# Patient Record
Sex: MALE | Race: WHITE | Employment: OTHER | URBAN - METROPOLITAN AREA
[De-identification: names, ages, dates, MRNs, and addresses within clinical notes are randomized per-mention and may not be internally consistent; named-entity substitution may affect disease eponyms.]

---

## 2017-08-24 ENCOUNTER — OFFICE VISIT (OUTPATIENT)
Dept: CARDIOLOGY CLINIC | Age: 72
End: 2017-08-24

## 2017-08-24 VITALS
HEART RATE: 74 BPM | OXYGEN SATURATION: 97 % | HEIGHT: 70 IN | WEIGHT: 183 LBS | DIASTOLIC BLOOD PRESSURE: 90 MMHG | SYSTOLIC BLOOD PRESSURE: 130 MMHG | BODY MASS INDEX: 26.2 KG/M2

## 2017-08-24 DIAGNOSIS — I10 ESSENTIAL HYPERTENSION: ICD-10-CM

## 2017-08-24 DIAGNOSIS — R94.31 ABNORMAL ELECTROCARDIOGRAM: ICD-10-CM

## 2017-08-24 DIAGNOSIS — I42.9 CARDIOMYOPATHY, UNSPECIFIED TYPE (HCC): ICD-10-CM

## 2017-08-24 DIAGNOSIS — I47.1 SVT (SUPRAVENTRICULAR TACHYCARDIA) (HCC): ICD-10-CM

## 2017-08-24 DIAGNOSIS — Z98.890 S/P ABLATION OPERATION FOR ARRHYTHMIA: ICD-10-CM

## 2017-08-24 DIAGNOSIS — R42 DIZZY SPELLS: ICD-10-CM

## 2017-08-24 DIAGNOSIS — Z95.9 CARDIAC DEVICE IN SITU: Primary | ICD-10-CM

## 2017-08-24 DIAGNOSIS — Z86.79 S/P ABLATION OPERATION FOR ARRHYTHMIA: ICD-10-CM

## 2017-08-24 NOTE — LETTER
8/24/2017 10:47 AM 
 
 
Edwar Lozoya Jr. 
[id number] 
1945 Insurance:  Medicare                  Auth # No auth needed Proc Date: Mon 9/25                Proc Time:  8:00am 
 
Performing MD : Dr. Chloe Norris                      Procedure:EP/Poss SVT ablation Hospital:  [facility]                                            PCP . Scheduled with: Brandy Simental / email                                                        Date:8/24/2017 HP: 8/24      EKG: ______    Labs:______  CXR: _______  Orders:  9/18 Special Instructions:  _____________________________________________________ 
______________________________________________________________________ 
______________________________________________________________________ Date Faxed:   ______________   Pages Faxed: ___________________ The materials enclosed with this facsimile transmission are private and confidential and are the property of the sender. If you are not the intended recipient, be advised that any unauthorized use, disclosure, copying, distribution, or the taking of any action in reliance on the contents of this telecopied information is strictly prohibited. If you have received this in error, please immediately notify the sender via telephone to arrange for return of the forwarded documentation.

## 2017-08-24 NOTE — PROGRESS NOTES
History of Present Illness:  A 74 y.o. man here for follow up. He has a Loop recorder in place and he is accompanied by his wife. He went to [facility] last year and did quite well. His wife checks his blood pressure at home and his heart rate has been in the 40s. He had an implantable Loop recorder last April. He denies any new chest pain, dyspnea, syncope, PND, orthopnea or edema. He does have occasional dizziness at varying times during the day. He has a history of PVCs with associated cardiomyopathy. Impression:   Implantable Loop recorder with multiple episodes of SVT lasting up to one minute. History of bradycardia with heart rate down to 40s but nothing recorded on Loop monitor. History of PVCs with ablation a few years ago with Dr. Ty Judd. History of cardiomyopathy, improving to normal with EF 55%. Gastric outflow obstruction and remote pancreatitis, improved. Mild aortic insufficiency. Hypertension. Previous pancreatitis. His device does show multiple episodes of SVT but does not appear to be PVCs or VT. He does not have any syncope but does have some dizziness. I have discussed options. Given his history of bradycardia, I am hesitant to give another beta-blocker. I will schedule him for an electrophysiology study with possible SVT ablation. All questions answered. I will also obtain an echocardiogram to re-evaluate his left ventricular function. Total care time spent in reviewing the case, multiple EMR databases, physician notes, procedure notes, examining the patient, and documentation, is 40 minutes.      Discussed in details with patient at bedside. All questions were answered to their full satisfaction. Risk, benefit and alternatives were discussed. More than 50% time spent in counseling and coordination of care.     Past Medical History:   Diagnosis Date    Anemia     ARF (acute renal failure) (HCC)     Bradycardia     Chronic cholecystitis     DVT (deep venous thrombosis) ([facility]     Echocardiogram 05/21/2015    EF 55-60%. No RWMA. Gr 1 DDfx. Septal knob. RVSP 25-30 mmHg. Mild MR. Mild TR. Mild PI.      ED (erectile dysfunction)     Esophageal reflux     Essential hypertension     GERD (gastroesophageal reflux disease)     Hypercholesteremia     Leukocytosis     Nuclear cardiac imaging test 10/29/2010    Slightly mottled perfusion w/o focal reversible defect. EF 42%. Global hypk. May represent a nonspecific cardiomyopathy.  Pancreatitis     Prostate CA ([facility]     Stage [id number] Junaid sum 3+3, s/p DVP 5/24/07      Ventral hernia        Current Outpatient Prescriptions   Medication Sig Dispense Refill    lansoprazole (PREVACID) 30 mg capsule Take  by mouth Daily (before breakfast).  Cholecalciferol, Vitamin D3, (VITAMIN D3) 1,000 unit cap Take  by mouth.  therapeutic multivitamin (THERAGRAN) tablet Take 1 tablet by mouth daily.  polyethylene glycol (MIRALAX) 17 gram packet Take 17 g by mouth daily.  DOCUSATE SODIUM (COLACE PO) Take  by mouth. Social History   reports that he quit smoking about 33 years ago. He has a 19.00 pack-year smoking history. He does not have any smokeless tobacco history on file. reports that he drinks alcohol. Family History  family history includes Cancer in his mother; Heart Disease in his father; Hypertension in his maternal grandmother and mother. Review of Systems  Except as stated above include:  Constitutional: Negative for fever, chills and malaise/fatigue. HEENT: No congestion or recent URI. Gastrointestinal: No nausea, vomiting, abdominal pain, bloody stools. Pulmonary:  Negative except as stated above. Cardiac:  Negative except as stated above. Musculoskeletal: Negative except as stated above. Neurological:  No localized symptoms. Skin:  Negative except as stated above. Psych:  No mood swings. Endocrine:  No heat/cold intolerance.     PHYSICAL EXAM  BP Readings from Last 3 Encounters:   08/24/17 130/90   08/25/16 120/80   05/27/16 124/90     Pulse Readings from Last 3 Encounters:   08/24/17 74   08/25/16 67   05/27/16 83     Wt Readings from Last 3 Encounters:   08/24/17 83 kg (183 lb)   08/25/16 85.7 kg (189 lb)   05/27/16 83.5 kg (184 lb)     General:   Well developed, well groomed. Head/Neck:   No jugular venous distention     No carotid bruits. No evidence of xanthelasma. Lungs:   No respiratory distress. Clear bilaterally. Heart:    Regular rate and rhythm. Normal S1/S2. Palpation of heart with normal point of maximum impulse. No significant murmurs, rubs or gallops. Abdomen:   Soft and nontender. No palpable abdominal mass or bruits. Extremities:   Intact peripheral pulses. No significant edema. Neurological:   Alert and oriented to person, place, time. No focal neurological deficit visually.

## 2017-08-24 NOTE — PROGRESS NOTES
1. Have you been to the ER, urgent care clinic since your last visit? Hospitalized since your last visit? No     2. Have you seen or consulted any other health care providers outside of the [address] [place] [address] since your last visit? Include any pap smears or colon screening.  No

## 2017-08-24 NOTE — MR AVS SNAPSHOT
Visit Information Date & Time Provider Department Dept. Phone Encounter #  
 8/24/2017 10:00 AM Valentino Rosa MD [facility] [address] [id number] Follow-up Instructions Follow-up and Disposition History Your Appointments 9/18/2017  1:00 PM  
PRE PROCEDURE with Bp [facility] ([facility]) Appt Note: EP /Poss SVT 9/25 [place] [id number] [phone] [address] [place], [place] [address] Upcoming Health Maintenance Date Due DTaP/Tdap/Td series (1 - Tdap) 6/23/1964 FOBT Q 1 YEAR AGE 50-75 6/23/1993 ZOSTER VACCINE AGE 60> 4/23/2003 GLAUCOMA SCREENING Q2Y 6/23/2008 Pneumococcal 65+ High/Highest Risk (1 of 2 - PCV13) 6/23/2008 MEDICARE YEARLY EXAM 6/23/2008 INFLUENZA AGE 9 TO ADULT 8/1/2017 Allergies as of 8/24/2017  Review Complete On: 8/24/2017 By: Valentino Rosa MD  
  
 Severity Noted Reaction Type Reactions Hydrocod-cpm-pe-acetaminophen  06/04/2013    Nausea and Vomiting Current Immunizations  Never Reviewed No immunizations on file. Not reviewed this visit You Were Diagnosed With   
  
 Codes Comments Cardiac device in situ    -  Primary ICD-10-CM: Z95.9 ICD-9-CM: V45.00 Essential hypertension     ICD-10-CM: I10 
ICD-9-CM: 401.9 S/P ablation operation for arrhythmia     ICD-10-CM: Z98.890, Z86.79 
ICD-9-CM: V45.89 Dizzy spells     ICD-10-CM: Z16 ICD-9-CM: 780.4 SVT (supraventricular tachycardia) (HCC)     ICD-10-CM: I47.1 ICD-9-CM: 427.89 Cardiomyopathy, unspecified type ([facility]     ICD-10-CM: I42.9 ICD-9-CM: 425.4 Abnormal electrocardiogram     ICD-10-CM: R94.31 
ICD-9-CM: 794.31 Vitals BP Pulse Height(growth percentile) Weight(growth percentile) SpO2 BMI  
 130/90 74 5' 10\" (1.778 m) 183 lb (83 kg) 97% 26.26 kg/m2 Smoking Status Former Smoker Vitals History BMI and BSA Data Body Mass Index Body Surface Area  
 26.26 kg/m 2 2.02 m 2 Preferred Pharmacy Pharmacy Name Phone [facility] [place], [address] [phone] Your Updated Medication List  
  
   
This list is accurate as of: 8/24/17 10:52 AM.  Always use your most recent med list.  
  
  
  
  
 Amanda Fake Take  by mouth.  
  
 lansoprazole 30 mg capsule Commonly known as:  PREVACID Take  by mouth Daily (before breakfast). MIRALAX 17 gram packet Generic drug:  polyethylene glycol Take 17 g by mouth daily. therapeutic multivitamin tablet Commonly known as:  [facility] Take 1 tablet by mouth daily. VITAMIN D3 1,000 unit Cap Generic drug:  cholecalciferol Take  by mouth. We Performed the Following AMB POC EKG ROUTINE W/ 12 LEADS, INTER & REP [58057 CPT(R)] To-Do List   
 08/24/2017 ECHO:  2D ECHO COMPLETE ADULT (TTE) W OR WO CONTR   
  
 08/28/2017 7:30 AM  
  Appointment with [address] CV SERV at [facility] NON-INVASIVE CARD ([phone]) Patient needs to bring a current list of all medications  No preparation is required for this study  This study requires patient to bring a written physicians order or the MD office may fax the order to Central Scheduling at [phone]. This exam is performed in the [address] [place] [address] at [facility] in the echo department. Please have the patient arrive 15 minutes prior to their schedule appointment time and report to Patient Registration at the main entrance of the hospital.     AGE LIMIT for this procedure at [facility] is 25years old. All patients under 25years of age should be referred to [facility]. Introducing [facility] & HEALTH SERVICES! Samia Paiz introduces Play[employer]test patient portal. Now you can access parts of your medical record, email your doctor's office, and request medication refills online. 1. In your internet browser, go to [email or web address] 2. Click on the First Time User? Click Here link in the Sign In box. You will see the New Member Sign Up page. 3. Enter your [employer] Access Code exactly as it appears below. You will not need to use this code after youve completed the sign-up process. If you do not sign up before the expiration date, you must request a new code. · [employer] Access Code: [email or web address] Expires: 11/22/2017  9:47 AM 
 
4. Enter the last four digits of your Social Security Number (xxxx) and Date of Birth (mm/dd/yyyy) as indicated and click Submit. You will be taken to the next sign-up page. 5. Create a [employer] ID. This will be your [employer] Go login ID and cannot be changed, so think of one that is secure and easy to remember. 6. Create a [employer] password. You can change your password at any time. 7. Enter your Password Reset Question and Answer. This can be used at a later time if you forget your password. 8. Enter your e-mail address. You will receive e-mail notification when new information is available in [address] 9. Click Sign Up. You can now view and download portions of your medical record. 10. Click the Download Summary menu link to download a portable copy of your medical information. If you have questions, please visit the Frequently Asked Questions section of the [employer] website. Remember, [employer] is NOT to be used for urgent needs. For medical emergencies, dial 911. Now available from your iPhone and Android! Please provide this summary of care documentation to your next provider. Your primary care clinician is listed as So Malhotra. If you have any questions after today's visit, please call [phone].

## 2017-08-28 ENCOUNTER — HOSPITAL ENCOUNTER (OUTPATIENT)
Dept: NON INVASIVE DIAGNOSTICS | Age: 72
Discharge: HOME | End: 2017-08-28
Attending: INTERNAL MEDICINE
Payer: MEDICARE

## 2017-08-28 DIAGNOSIS — I42.9 CARDIOMYOPATHY, UNSPECIFIED TYPE (HCC): ICD-10-CM

## 2017-08-28 DIAGNOSIS — R94.31 ABNORMAL ELECTROCARDIOGRAM: ICD-10-CM

## 2017-08-28 PROCEDURE — 93306 TTE W/DOPPLER COMPLETE: CPT

## 2017-08-29 NOTE — PROGRESS NOTES
Per your last note \"  I will also obtain an echocardiogram to re-evaluate his left ventricular function

## 2017-09-18 ENCOUNTER — HOSPITAL ENCOUNTER (OUTPATIENT)
Dept: GENERAL RADIOLOGY | Age: 72
Discharge: HOME | End: 2017-09-18
Payer: MEDICARE

## 2017-09-18 ENCOUNTER — OFFICE VISIT (OUTPATIENT)
Dept: CARDIOLOGY CLINIC | Age: 72
End: 2017-09-18

## 2017-09-18 ENCOUNTER — HOSPITAL ENCOUNTER (OUTPATIENT)
Dept: LAB | Age: 72
Discharge: HOME | End: 2017-09-18
Payer: MEDICARE

## 2017-09-18 DIAGNOSIS — I47.1 SVT (SUPRAVENTRICULAR TACHYCARDIA) (HCC): ICD-10-CM

## 2017-09-18 DIAGNOSIS — Z01.812 PRE-PROCEDURE LAB EXAM: ICD-10-CM

## 2017-09-18 DIAGNOSIS — Z95.9 CARDIAC DEVICE IN SITU: ICD-10-CM

## 2017-09-18 DIAGNOSIS — I47.1 SVT (SUPRAVENTRICULAR TACHYCARDIA) (HCC): Primary | ICD-10-CM

## 2017-09-18 LAB
ALBUMIN SERPL-MCNC: 4 G/DL (ref 3.4–5)
ALBUMIN/GLOB SERPL: 1.1 {RATIO} (ref 0.8–1.7)
ALP SERPL-CCNC: 62 U/L (ref 45–117)
ALT SERPL-CCNC: 20 U/L (ref 16–61)
ANION GAP SERPL CALC-SCNC: 7 MMOL/L (ref 3–18)
AST SERPL-CCNC: 15 U/L (ref 15–37)
BASOPHILS # BLD: 0 K/UL (ref 0–0.1)
BASOPHILS NFR BLD: 1 % (ref 0–2)
BILIRUB SERPL-MCNC: 0.5 MG/DL (ref 0.2–1)
BUN SERPL-MCNC: 14 MG/DL (ref 7–18)
BUN/CREAT SERPL: 18 (ref 12–20)
CALCIUM SERPL-MCNC: 9.6 MG/DL (ref 8.5–10.1)
CHLORIDE SERPL-SCNC: 106 MMOL/L (ref 100–108)
CO2 SERPL-SCNC: 27 MMOL/L (ref 21–32)
CREAT SERPL-MCNC: 0.76 MG/DL (ref 0.6–1.3)
DIFFERENTIAL METHOD BLD: ABNORMAL
EOSINOPHIL # BLD: 0.1 K/UL (ref 0–0.4)
EOSINOPHIL NFR BLD: 3 % (ref 0–5)
ERYTHROCYTE [DISTWIDTH] IN BLOOD BY AUTOMATED COUNT: 13.3 % (ref 11.6–14.5)
GLOBULIN SER CALC-MCNC: 3.8 G/DL (ref 2–4)
GLUCOSE SERPL-MCNC: 89 MG/DL (ref 74–99)
HCT VFR BLD AUTO: 39.9 % (ref 36–48)
HGB BLD-MCNC: 13.5 G/DL (ref 13–16)
INR PPP: 1.1 (ref 0.8–1.2)
LYMPHOCYTES # BLD: 1.2 K/UL (ref 0.9–3.6)
LYMPHOCYTES NFR BLD: 23 % (ref 21–52)
MCH RBC QN AUTO: 29.7 PG (ref 24–34)
MCHC RBC AUTO-ENTMCNC: 33.8 G/DL (ref 31–37)
MCV RBC AUTO: 87.9 FL (ref 74–97)
MONOCYTES # BLD: 0.3 K/UL (ref 0.05–1.2)
MONOCYTES NFR BLD: 6 % (ref 3–10)
NEUTS SEG # BLD: 3.4 K/UL (ref 1.8–8)
NEUTS SEG NFR BLD: 67 % (ref 40–73)
PLATELET # BLD AUTO: 152 K/UL (ref 135–420)
PMV BLD AUTO: 10.7 FL (ref 9.2–11.8)
POTASSIUM SERPL-SCNC: 4.3 MMOL/L (ref 3.5–5.5)
PROT SERPL-MCNC: 7.8 G/DL (ref 6.4–8.2)
PROTHROMBIN TIME: 14 SEC (ref 11.5–15.2)
RBC # BLD AUTO: 4.54 M/UL (ref 4.7–5.5)
SODIUM SERPL-SCNC: 140 MMOL/L (ref 136–145)
WBC # BLD AUTO: 5 K/UL (ref 4.6–13.2)

## 2017-09-18 PROCEDURE — 85610 PROTHROMBIN TIME: CPT | Performed by: INTERNAL MEDICINE

## 2017-09-18 PROCEDURE — 71020 XR CHEST PA LAT: CPT

## 2017-09-18 PROCEDURE — 85025 COMPLETE CBC W/AUTO DIFF WBC: CPT | Performed by: INTERNAL MEDICINE

## 2017-09-18 PROCEDURE — 36415 COLL VENOUS BLD VENIPUNCTURE: CPT | Performed by: INTERNAL MEDICINE

## 2017-09-18 PROCEDURE — 80053 COMPREHEN METABOLIC PANEL: CPT | Performed by: INTERNAL MEDICINE

## 2017-09-18 RX ORDER — SODIUM CHLORIDE 9 MG/ML
25 INJECTION, SOLUTION INTRAVENOUS CONTINUOUS
Status: CANCELLED | OUTPATIENT
Start: 2017-09-18

## 2017-09-18 NOTE — PATIENT INSTRUCTIONS
DR. COUGHLIN'S [facility]          Patient  EP Instructions                  1. You are scheduled to have a EP with possible SVT ablation on  9/25/17 , at 8 am.    Please check in at 7 am .    2. Please go to DR. COUGHLIN'STEPHANY TSANG and jessica in the outpatient parking lot that is located around to the back of the hospital and enter through the [facility] building. Once you enter through the [facility] check in with the  there. The  will either give you directions or assist you in getting to the cath holding area. 3.  [x]       You are not to eat or drink anything after midnight the morning of the procedure. 4. Please continue to take your medications with a small sip of water on the morning of the procedure with the following exceptions:  N/A     5. If you are diabetic, do not take your insulin/sugar pill the morning of the procedure. 6. We encourage families to wait in the waiting room on the first floor while the procedure is being done. The Doctor will come out and talk with you as soon as the procedure is over. 7. There is the possibility that you may spend the night in the hospital, depending on the results of the procedure. This will be determined after the procedure is done. 8.   If you or your family have any questions, please call our office Monday-Friday 9:00am         -4:30 pm , at [phone], and ask to speak to one of the nurses.

## 2017-09-18 NOTE — MR AVS SNAPSHOT
Visit Information Date & Time Provider Department Dept. Phone Encounter #  
 9/18/2017  1:00 PM Jaspreet Cortez Lazaro Lr [facility] [address] [id number] Your Appointments 9/18/2017  1:00 PM  
PRE PROCEDURE with B[facility] [place] [facility] ([facility]) Appt Note: EP /Poss SVT 9/25 [facility] [address] [address] [id number] [phone] [address] [place] [address] [address] Upcoming Health Maintenance Date Due DTaP/Tdap/Td series (1 - Tdap) 6/23/1964 FOBT Q 1 YEAR AGE 50-75 6/23/1993 ZOSTER VACCINE AGE 60> 4/23/2003 GLAUCOMA SCREENING Q2Y 6/23/2008 Pneumococcal 65+ High/Highest Risk (1 of 2 - PCV13) 6/23/2008 MEDICARE YEARLY EXAM 6/23/2008 INFLUENZA AGE 9 TO ADULT 8/1/2017 Allergies as of 9/18/2017  Review Complete On: 8/24/2017 By: Kate Barger MD  
  
 Severity Noted Reaction Type Reactions Hydrocod-cpm-pe-acetaminophen  06/04/2013    Nausea and Vomiting Current Immunizations  Never Reviewed No immunizations on file. Not reviewed this visit You Were Diagnosed With   
  
 Codes Comments SVT (supraventricular tachycardia) ([facility]    -  Primary ICD-10-CM: I47.1 ICD-9-CM: 427.89 Cardiac device in situ     ICD-10-CM: Z95.9 ICD-9-CM: V45.00 Pre-procedure lab exam     ICD-10-CM: W94.686 ICD-9-CM: V72.63 Vitals Smoking Status Former Smoker Preferred Pharmacy Pharmacy Name Phone [facility] - [address], [address] [phone] Your Updated Medication List  
  
   
This list is accurate as of: 9/18/17  9:38 AM.  Always use your most recent med list.  
  
  
  
  
 Mahnaz Rodrigez Take  by mouth.  
  
 lansoprazole 30 mg capsule Commonly known as:  PREVACID Take  by mouth Daily (before breakfast). MIRALAX 17 gram packet Generic drug:  polyethylene glycol Take 17 g by mouth daily. therapeutic multivitamin tablet Commonly known as:  [facility] Take 1 tablet by mouth daily. VITAMIN D3 1,000 unit Cap Generic drug:  cholecalciferol Take  by mouth. To-Do List   
 09/18/2017 Lab:  CBC WITH AUTOMATED DIFF   
  
 09/18/2017 Lab:  METABOLIC PANEL, COMPREHENSIVE   
  
 09/18/2017 Lab:  PROTHROMBIN TIME + INR   
  
 09/18/2017 Imaging:  XR CHEST PA LAT   
  
 09/25/2017 8:00 AM  
  Appointment with [facility] ANESTHESIA 2; [facility] EP LAB; [facility] CATH HOLDING at [address] [facility] ([phone]) Patient Instructions DR. IVAN [facility] [place] Patient  EP Instructions 1. You are scheduled to have a EP with possible SVT ablation on  9/25/17 , at 8 am.    Please check in at 7 am . 2. Please go to DR. MONCHO TSANG and jessica in the outpatient parking lot that is located around to the back of the hospital and enter through the [facility] building. Once you enter through the [facility] check in with the  there. The  will either give you directions or assist you in getting to the cath holding area. 3.         You are not to eat or drink anything after midnight the morning of the procedure. 4. Please continue to take your medications with a small sip of water on the morning of the procedure with the following exceptions:  N/A  
 
5. If you are diabetic, do not take your insulin/sugar pill the morning of the procedure. 6. We encourage families to wait in the waiting room on the first floor while the procedure is being done. The Doctor will come out and talk with you as soon as the procedure is over. 7. There is the possibility that you may spend the night in the hospital, depending on the results of the procedure. This will be determined after the procedure is done.   
 
8.   If you or your family have any questions, please call our office Monday-Friday 9:00am  
      -4:30 pm , at [phone], and ask to speak to one of the nurses. Introducing [facility] & HEALTH SERVICES! Dear Ced Reyes: Thank you for requesting a PeerPong account. Our records indicate that you already have an active PeerPong account. You can access your account anytime at [email or web address] Did you know that you can access your hospital and ER discharge instructions at any time in PeerPong? You can also review all of your test results from your hospital stay or ER visit. Additional Information If you have questions, please visit the Frequently Asked Questions section of the PeerPong website at [email or web address] Remember, PeerPong is NOT to be used for urgent needs. For medical emergencies, dial 911. Now available from your iPhone and Android! Please provide this summary of care documentation to your next provider. Your primary care clinician is listed as Trisha Roberts. If you have any questions after today's visit, please call [phone].

## 2017-09-18 NOTE — PROGRESS NOTES
Patient given written instructions at time of visit and allowed time for questions to be answered Mino Olivo LPN

## 2017-09-23 NOTE — H&P
Seen & examined. See full H&P/notes for details. Plan EP study with possible SVT ablation. All risks, benefits, alternatives discussed. All questions answered. Risks included but are not limited to pain, infection, bleeding, stroke, perforation, valve damage, nerve damage, diaphragmatic paralysis, rupture of esophagus and possible fistula from heart to esophagus, deep venous thrombosis, emergent open heart surgery, and death both during procedure and post-operatively. History of Present Illness:  A 74 y.o. man here for follow up. He has a Loop recorder in place and he is accompanied by his wife. He went to [facility] last year and did quite well. His wife checks his blood pressure at home and his heart rate has been in the 40s. He had an implantable Loop recorder last April. He denies any new chest pain, dyspnea, syncope, PND, orthopnea or edema. He does have occasional dizziness at varying times during the day. He has a history of PVCs with associated cardiomyopathy.     Impression:   Implantable Loop recorder with multiple episodes of SVT lasting up to one minute. History of bradycardia with heart rate down to 40s but nothing recorded on Loop monitor. History of PVCs with ablation a few years ago with Dr. Jorge Sigala. History of cardiomyopathy, improving to normal with EF 55%. Echo done August 2017 with EF 50%  Gastric outflow obstruction and remote pancreatitis, improved. Mild aortic insufficiency. Hypertension. Previous pancreatitis. History of IVC filter, still in place.     His device does show multiple episodes of SVT but does not appear to be PVCs or VT. He does not have any syncope but does have some dizziness. I have discussed options. Given his history of bradycardia, I am hesitant to give another beta-blocker. I will schedule him for an electrophysiology study with possible SVT ablation. All questions answered.   I will also obtain an echocardiogram to re-evaluate his left ventricular function.       Total care time spent in reviewing the case, multiple EMR databases, physician notes, procedure notes, examining the patient, and documentation, is 40 minutes.      Discussed in details with patient at bedside. All questions were answered to their full satisfaction. Risk, benefit and alternatives were discussed. More than 50% time spent in counseling and coordination of care.          Past Medical History:   Diagnosis Date    Anemia      [facility]      Bradycardia      Chronic cholecystitis      DVT (deep venous thrombosis) ([facility])      Echocardiogram 05/21/2015     EF 55-60%. No RWMA. Gr 1 DDfx. Septal knob. RVSP 25-30 mmHg. Mild MR. Mild TR. Mild PI.      ED (erectile dysfunction)      Esophageal reflux      Essential hypertension      GERD (gastroesophageal reflux disease)      Hypercholesteremia      Leukocytosis      Nuclear cardiac imaging test 10/29/2010     Slightly mottled perfusion w/o focal reversible defect. EF 42%. Global hypk. May represent a nonspecific cardiomyopathy.  Pancreatitis      [facility])       Stage E7oDuNs Junaid sum 3+3, s/p DVP 5/24/07      Ventral hernia                  Current Outpatient Prescriptions   Medication Sig Dispense Refill    lansoprazole (PREVACID) 30 mg capsule Take  by mouth Daily (before breakfast).        Cholecalciferol, Vitamin D3, (VITAMIN D3) 1,000 unit cap Take  by mouth.        therapeutic multivitamin (THERAGRAN) tablet Take 1 tablet by mouth daily.        polyethylene glycol (MIRALAX) 17 gram packet Take 17 g by mouth daily.        DOCUSATE SODIUM (COLACE PO) Take  by mouth.               Social History   reports that he quit smoking about 33 years ago. He has a 19.00 pack-year smoking history. He does not have any smokeless tobacco history on file.    reports that he drinks alcohol.     Family History  family history includes Cancer in his mother; Heart Disease in his father; Hypertension in his maternal grandmother and mother.     Review of Systems  Except as stated above include:  Constitutional: Negative for fever, chills and malaise/fatigue. HEENT: No congestion or recent URI. Gastrointestinal: No nausea, vomiting, abdominal pain, bloody stools. Pulmonary:  Negative except as stated above. Cardiac:  Negative except as stated above. Musculoskeletal: Negative except as stated above. Neurological:  No localized symptoms. Skin:  Negative except as stated above. Psych:  No mood swings. Endocrine:  No heat/cold intolerance.     PHYSICAL EXAM      BP Readings from Last 3 Encounters:   08/24/17 130/90   08/25/16 120/80   05/27/16 124/90          Pulse Readings from Last 3 Encounters:   08/24/17 74   08/25/16 67   05/27/16 83          Wt Readings from Last 3 Encounters:   08/24/17 83 kg (183 lb)   08/25/16 85.7 kg (189 lb)   05/27/16 83.5 kg (184 lb)      General:                    Well developed, well groomed. Head/Neck:               No jugular venous distention                                               No carotid bruits. No evidence of xanthelasma. Lungs:                       No respiratory distress. Clear bilaterally. Heart:                                              Regular rate and rhythm. Normal S1/S2. Palpation of heart with normal point of maximum impulse. No significant murmurs, rubs or gallops. Abdomen:                  Soft and nontender. No palpable abdominal mass or bruits. Extremities:               Intact peripheral pulses. No significant edema. Neurological:             Alert and oriented to person, place, time.                                                 No focal neurological deficit visually.         Electronically signed by Jennifer Young MD at 08/24/17 6630

## 2017-09-25 ENCOUNTER — ANESTHESIA EVENT (OUTPATIENT)
Dept: CARDIAC CATH/INVASIVE PROCEDURES | Age: 72
End: 2017-09-25
Payer: MEDICARE

## 2017-09-25 ENCOUNTER — ANESTHESIA (OUTPATIENT)
Dept: CARDIAC CATH/INVASIVE PROCEDURES | Age: 72
End: 2017-09-25
Payer: MEDICARE

## 2017-09-25 ENCOUNTER — HOSPITAL ENCOUNTER (OUTPATIENT)
Dept: CARDIAC CATH/INVASIVE PROCEDURES | Age: 72
Discharge: HOME | End: 2017-09-25
Attending: INTERNAL MEDICINE | Admitting: INTERNAL MEDICINE
Payer: MEDICARE

## 2017-09-25 VITALS
WEIGHT: 178 LBS | SYSTOLIC BLOOD PRESSURE: 111 MMHG | OXYGEN SATURATION: 98 % | HEART RATE: 66 BPM | BODY MASS INDEX: 25.48 KG/M2 | TEMPERATURE: 97.6 F | HEIGHT: 70 IN | RESPIRATION RATE: 16 BRPM | DIASTOLIC BLOOD PRESSURE: 69 MMHG

## 2017-09-25 PROCEDURE — 74011250636 HC RX REV CODE- 250/636: Performed by: INTERNAL MEDICINE

## 2017-09-25 PROCEDURE — 74011250636 HC RX REV CODE- 250/636

## 2017-09-25 PROCEDURE — 74011000250 HC RX REV CODE- 250

## 2017-09-25 PROCEDURE — 76060000034 HC ANESTHESIA 1.5 TO 2 HR

## 2017-09-25 PROCEDURE — 74011000250 HC RX REV CODE- 250: Performed by: INTERNAL MEDICINE

## 2017-09-25 PROCEDURE — 77030018729 EP STUDY

## 2017-09-25 RX ORDER — MIDAZOLAM HYDROCHLORIDE 1 MG/ML
INJECTION, SOLUTION INTRAMUSCULAR; INTRAVENOUS AS NEEDED
Status: DISCONTINUED | OUTPATIENT
Start: 2017-09-25 | End: 2017-09-25 | Stop reason: HOSPADM

## 2017-09-25 RX ORDER — SODIUM CHLORIDE 9 MG/ML
25 INJECTION, SOLUTION INTRAVENOUS CONTINUOUS
Status: DISCONTINUED | OUTPATIENT
Start: 2017-09-25 | End: 2017-09-25 | Stop reason: HOSPADM

## 2017-09-25 RX ORDER — PROPOFOL 10 MG/ML
INJECTION, EMULSION INTRAVENOUS AS NEEDED
Status: DISCONTINUED | OUTPATIENT
Start: 2017-09-25 | End: 2017-09-25 | Stop reason: HOSPADM

## 2017-09-25 RX ORDER — ISOPROTERENOL HYDROCHLORIDE 0.2 MG/ML
0-100 INJECTION, SOLUTION INTRAMUSCULAR; INTRAVENOUS ONCE
Status: DISCONTINUED | OUTPATIENT
Start: 2017-09-25 | End: 2017-09-25 | Stop reason: HOSPADM

## 2017-09-25 RX ORDER — PROPOFOL 10 MG/ML
INJECTION, EMULSION INTRAVENOUS
Status: DISCONTINUED | OUTPATIENT
Start: 2017-09-25 | End: 2017-09-25 | Stop reason: HOSPADM

## 2017-09-25 RX ORDER — HEPARIN SODIUM 200 [USP'U]/100ML
500 INJECTION, SOLUTION INTRAVENOUS ONCE
Status: COMPLETED | OUTPATIENT
Start: 2017-09-25 | End: 2017-09-25

## 2017-09-25 RX ORDER — OXYCODONE AND ACETAMINOPHEN 5; 325 MG/1; MG/1
1 TABLET ORAL
Qty: 15 TAB | Refills: 0 | Status: SHIPPED | OUTPATIENT
Start: 2017-09-25 | End: 2017-12-28 | Stop reason: ALTCHOICE

## 2017-09-25 RX ORDER — FENTANYL CITRATE 50 UG/ML
INJECTION, SOLUTION INTRAMUSCULAR; INTRAVENOUS AS NEEDED
Status: DISCONTINUED | OUTPATIENT
Start: 2017-09-25 | End: 2017-09-25 | Stop reason: HOSPADM

## 2017-09-25 RX ORDER — ONDANSETRON 2 MG/ML
INJECTION INTRAMUSCULAR; INTRAVENOUS AS NEEDED
Status: DISCONTINUED | OUTPATIENT
Start: 2017-09-25 | End: 2017-09-25 | Stop reason: HOSPADM

## 2017-09-25 RX ORDER — LIDOCAINE HYDROCHLORIDE 10 MG/ML
1-90 INJECTION, SOLUTION EPIDURAL; INFILTRATION; INTRACAUDAL; PERINEURAL
Status: DISCONTINUED | OUTPATIENT
Start: 2017-09-25 | End: 2017-09-25 | Stop reason: HOSPADM

## 2017-09-25 RX ADMIN — ONDANSETRON 4 MG: 2 INJECTION INTRAMUSCULAR; INTRAVENOUS at 09:05

## 2017-09-25 RX ADMIN — HEPARIN SODIUM 1000 UNITS: 200 INJECTION, SOLUTION INTRAVENOUS at 08:26

## 2017-09-25 RX ADMIN — PROPOFOL 50 MCG/KG/MIN: 10 INJECTION, EMULSION INTRAVENOUS at 08:50

## 2017-09-25 RX ADMIN — FENTANYL CITRATE 50 MCG: 50 INJECTION, SOLUTION INTRAMUSCULAR; INTRAVENOUS at 08:06

## 2017-09-25 RX ADMIN — LIDOCAINE HYDROCHLORIDE 20 ML: 10 INJECTION, SOLUTION EPIDURAL; INFILTRATION; INTRACAUDAL; PERINEURAL at 08:31

## 2017-09-25 RX ADMIN — MIDAZOLAM HYDROCHLORIDE 1 MG: 1 INJECTION, SOLUTION INTRAMUSCULAR; INTRAVENOUS at 07:59

## 2017-09-25 RX ADMIN — MIDAZOLAM HYDROCHLORIDE 1 MG: 1 INJECTION, SOLUTION INTRAMUSCULAR; INTRAVENOUS at 08:03

## 2017-09-25 RX ADMIN — FENTANYL CITRATE 50 MCG: 50 INJECTION, SOLUTION INTRAMUSCULAR; INTRAVENOUS at 08:21

## 2017-09-25 RX ADMIN — PROPOFOL 50 MG: 10 INJECTION, EMULSION INTRAVENOUS at 08:30

## 2017-09-25 NOTE — ANESTHESIA PREPROCEDURE EVALUATION
Anesthetic History   No history of anesthetic complications            Review of Systems / Medical History  Patient summary reviewed and pertinent labs reviewed    Pulmonary  Within defined limits                 Neuro/Psych   Within defined limits           Cardiovascular    Hypertension: well controlled        Dysrhythmias : PVC and SVT      Exercise tolerance: >4 METS     GI/Hepatic/Renal     GERD: well controlled           Endo/Other        Cancer (prostate)     Other Findings   Comments:   Risk Factors for Postoperative nausea/vomiting:       History of postoperative nausea/vomiting? NO       Female? NO       Motion sickness? NO       Intended opioid administration for postoperative analgesia? YES      Smoking Abstinence  Current Smoker? NO  Elective Surgery? YES  Seen preoperatively by anesthesiologist or proxy prior to day of surgery? YES  Pt abstained from smoking 24 hours prior to anesthesia?  N/A         Physical Exam    Airway  Mallampati: II  TM Distance: 4 - 6 cm  Neck ROM: normal range of motion   Mouth opening: Normal     Cardiovascular  Regular rate and rhythm,  S1 and S2 normal,  no murmur, click, rub, or gallop  Rhythm: regular  Rate: normal         Dental    Dentition: Upper partial plate     Pulmonary  Breath sounds clear to auscultation               Abdominal  GI exam deferred       Other Findings            Anesthetic Plan    ASA: 3  Anesthesia type: MAC          Induction: Intravenous  Anesthetic plan and risks discussed with: Patient

## 2017-09-25 NOTE — ROUTINE PROCESS
TRANSFER - IN REPORT:    Verbal report received from Jerrald Sicard, RCIS (name) on [facility].  being received from [place] (unit) for routine progression of care      Report consisted of patients Situation, Background, Assessment and   Recommendations(SBAR). Information from the following report(s) SBAR, Kardex, Procedure Summary and MAR was reviewed with the receiving nurse. Opportunity for questions and clarification was provided. Assessment completed upon patients arrival to unit and care assumed.

## 2017-09-25 NOTE — PROGRESS NOTES
Pt returned to [facility] post procedure. Pt alert and oriented times four. Pt denies pain at this time. Right groin site dressing clean, dry, and intact. No hematoma or bleeding noted at this time. Pt educated on post procedure care. Pt verbalized understanding. Pt provided with warm blanket and call bell. Wife brought to bedside per his request. Will continue to monitor.

## 2017-09-25 NOTE — ROUTINE PROCESS
TRANSFER - OUT REPORT:    Verbal report given to Anaya New RN (name) on [facility].  being transferred to [facility] ([facility]) for routine progression of care       Report consisted of patients Situation, Background, Assessment and   Recommendations(SBAR). Information from the following report(s) SBAR, Kardex, Procedure Summary and MAR was reviewed with the receiving nurse. Lines:   Peripheral IV 04/19/16 Left Hand (Active)        Opportunity for questions and clarification was provided.       Patient transported with:   [employer]

## 2017-09-25 NOTE — ANESTHESIA POSTPROCEDURE EVALUATION
Post-Anesthesia Evaluation and Assessment    Patient: Miguel Garrett MRN: [id number]  SSN: [id number]    YOB: 1945  Age: 74 y.o. Sex: male      Data from PACU flowsheet    Cardiovascular Function/Vital Signs  Visit Vitals    /72    Pulse 64    Temp 36.4 °C (97.6 °F)    Resp 12    Ht 5' 10\" (1.778 m)    Wt 80.7 kg (178 lb)    SpO2 95%    BMI 25.54 kg/m2       Patient is status post MAC anesthesia for * No procedures listed *. Nausea/Vomiting: controlled    Postoperative hydration reviewed and adequate. Pain:  Pain Scale 1: Numeric (0 - 10) (09/25/17 0717)  Pain Intensity 1: 0 (09/25/17 0930)   Managed      Mental Status and Level of Consciousness: Alert and oriented     Pulmonary Status:   O2 Device: Room air (09/25/17 1025)   Adequate oxygenation and airway patent    Complications related to anesthesia: None    Post-anesthesia assessment completed.  No concerns    Signed By: Mary Anne Brantley MD     September 25, 2017

## 2017-09-25 NOTE — IP AVS SNAPSHOT
Ravinder Dow 
 
 
 [address] Patient: Ekaterina Inman. MRN: [id number] DE [place]:[id number] You are allergic to the following Allergen Reactions Hydrocod-Cpm-Pe-Acetaminophen Nausea and Vomiting Recent Documentation Height Weight BMI Smoking Status 1.778 m 80.7 kg 25.54 kg/m2 Former Smoker Emergency Contacts Name Discharge Info Relation Home Work Mobile [address] CAREGIVER [3] Spouse [3] [phone] About your hospitalization You were admitted on:  September 25, 2017 You last received care in the:  [facility] [address] You were discharged on:  September 25, 2017 Unit phone number:  [phone] Why you were hospitalized Your primary diagnosis was:  Not on File Providers Seen During Your Hospitalizations Provider Role Specialty Primary office phone Jen Yates MD Attending Provider Cardiology [phone] Your Primary Care Physician (PCP) Primary Care Physician Office Phone Office Fax Babar Roth, [address] [facility] [phone] Follow-up Information Follow up With Details Comments Contact Info Bruno Pandey MD   [address] [facility] [address] [place] [address] [phone] Jen Yates MD In 3 months follow up [address] [facility] [address] [facility] [address] [place] [address] 
[phone] Current Discharge Medication List  
  
START taking these medications Dose & Instructions Dispensing Information Comments Morning Noon Evening Bedtime  
 oxyCODONE-acetaminophen 5-325 mg per tablet Commonly known as:  PERCOCET Your last dose was: Your next dose is:    
   
   
 Dose:  1 Tab Take 1 Tab by mouth every six (6) hours as needed for Pain. Max Daily Amount: 4 Tabs. Quantity:  15 Tab Refills:  0 CONTINUE these medications which have NOT CHANGED Dose & Instructions Dispensing Information Comments Morning Noon Evening Bedtime COLACE PO Your last dose was: Your next dose is: Take  by mouth. Refills:  0  
     
   
   
   
  
 lansoprazole 30 mg capsule Commonly known as:  PREVACID Your last dose was: Your next dose is: Take  by mouth Daily (before breakfast). Refills:  0 MIRALAX 17 gram packet Generic drug:  polyethylene glycol Your last dose was: Your next dose is:    
   
   
 Dose:  17 g Take 17 g by mouth as needed. Refills:  0  
     
   
   
   
  
 therapeutic multivitamin tablet Commonly known as:  [facility] Your last dose was: Your next dose is:    
   
   
 Dose:  1 Tab Take 1 tablet by mouth daily. Refills:  0  
     
   
   
   
  
 VITAMIN D3 1,000 unit Cap Generic drug:  cholecalciferol Your last dose was: Your next dose is: Take  by mouth. Refills:  0 Where to Get Your Medications Information on where to get these meds will be given to you by the nurse or doctor. ! Ask your nurse or doctor about these medications  
  oxyCODONE-acetaminophen 5-325 mg per tablet Discharge Instructions Disposition: When discharged to home will need follow-up in my office 4 weeks. Please call my office at [phone] if any questions or concerns. Restrictions: 
No driving for at least 24 hours after surgery. No heavy lifting > 10 lbs or prolonged standing, walking, or running x 1 week. OK to shower today over incision and remove dressing. Monitor groin for any signs of bleeding and please contact office at [phone] if any issues.   There may be some mild bruising which is not unusual. 
If any new symptoms develop, such as chest pain, dyspnea, dizziness, please contact office at [phone] immediately. DISCHARGE SUMMARY from Nurse The following personal items are in your possession at time of discharge: 
 
  
Visual Aid: At bedside PATIENT INSTRUCTIONS: 
 
 
F-face looks uneven A-arms unable to move or move unevenly S-speech slurred or non-existent T-time-call 911 as soon as signs and symptoms begin-DO NOT go Back to bed or wait to see if you get better-TIME IS BRAIN. Warning Signs of HEART ATTACK Call 911 if you have these symptoms: 
? Chest discomfort. Most heart attacks involve discomfort in the center of the chest that lasts more than a few minutes, or that goes away and comes back. It can feel like uncomfortable pressure, squeezing, fullness, or pain. ? Discomfort in other areas of the upper body. Symptoms can include pain or discomfort in one or both arms, the back, neck, jaw, or stomach. ? Shortness of breath with or without chest discomfort. ? Other signs may include breaking out in a cold sweat, nausea, or lightheadedness. Don't wait more than five minutes to call [address]! Fast action can save your life. Calling 911 is almost always the fastest way to get lifesaving treatment. Emergency Medical Services staff can begin treatment when they arrive  up to an hour sooner than if someone gets to the hospital by car. The discharge information has been reviewed with the patient and caregiver. The patient and caregiver verbalized understanding. Discharge medications reviewed with the patient and caregiver and appropriate educational materials and side effects teaching were provided. It is normal to feel tired the first couple days. Take it easy and follow the physicians instructions.    
 
CHECK THE CATHETER INSERTION SITE DAILY: 
 You may shower 24 hours after the procedure, remove the bandage during showering. Wash with soap and water and pat dry. Gentle cleaning of the site with soap and water is sufficient, cover with a dry clean dressing or bandage. Do not apply creams or powders to the area. Do not sit in a bathtub or pool of water for 7 days or until wound has completely healed. Temporary bruising and discomfort is normal and may last a few weeks. You may have a  formation of a small lump at the site which may last up to 6 weeks. CALL THE PHYSICIAN: If the site becomes red, swollen or feels warm to the touch If there is bleeding or drainage or if there is unusual pain at the groin or down the leg. If there is any bleeding, lie down, apply pressure or have someone apply pressure with a clean cloth until the bleeding stops. If the bleeding continues, call 911 to be transported to the hospital. 
DO  [facility] [address]. Activity: For the first 24-48 hours or as instructed by the physician: No lifting, pushing or pulling over 10 pounds and no straining the insertion site. Do not life grocery bags or the garbage can, do not run the vacuum  or  for 7 days. Start with short walks as in the hospital and gradually increase as tolerated each day. It is recommended to walk 30 minutes 5-7 days per week. Follow your physicians instructions on activity. Avoid walking outside in extremes of heat or cold. Walk inside when it is cold and windy or hot and humid. Things to keep in mind: 
No driving for at least 24 hours, or as designated by your physician. Limit the number of times you go up and down the stairs Take rests and pace yourself with activity. Be careful and do not strain with bowel movements. Medications: Take all medications as prescribed Call your physician if you have any questions Keep an updated list of your medications with you at all times and give a list to your physician and pharmacist 
 
Signs and Symptoms: 
Be cautious of symptoms of angina or recurrent symptoms such as chest discomfort, unusual shortness of breath or fatigue, palpitations. After Care: Follow up with your physician as instructed. Follow a heart healthy diet with proper portion control, daily stress management, daily exercise, blood pressure and cholesterol control , and smoking cessation. Patient armband removed and shredded Discharge Orders None [facility]! Dear Tal Becerra: Thank you for requesting a im3D account. Our records indicate that you already have an active im3D account. You can access your account anytime at [email or web address] Did you know that you can access your hospital and ER discharge instructions at any time in im3D? You can also review all of your test results from your hospital stay or ER visit. Additional Information If you have questions, please visit the Frequently Asked Questions section of the im3D website at [email or web address] Remember, im3D is NOT to be used for urgent needs. For medical emergencies, dial 911. Now available from your iPhone and Android! General Information Please provide this summary of care documentation to your next provider. Patient Signature:  ____________________________________________________________ Date:  ____________________________________________________________  
  
Odessa Ortega Provider Signature:  ____________________________________________________________ Date:  ____________________________________________________________

## 2017-09-25 NOTE — PROCEDURES
PROCEDURE    1. Cardiac electrophysiology study with infusion of isoproterenol. 2. Pacing and sensing from the right atrium, left atrium via the coronary sinus, and right ventricle with attempted arrhythmia induction. 3. Placement of coronary sinus, right ventricular, and right atrial catheters. 4. Sedation with anesthesia assistance.     PREPROCEDURE DIAGNOSIS:  Implantable Loop recorder with multiple episodes of SVT lasting up to one minute. History of bradycardia with heart rate down to 40s but nothing recorded on Loop monitor. History of PVCs with ablation a few years ago with Dr. Jason Spaulding. History of cardiomyopathy, improving to normal with EF 55%. Echo done August 2017 with EF 50%  Gastric outflow obstruction and remote pancreatitis, improved. Mild aortic insufficiency. Hypertension. Previous pancreatitis. History of IVC filter, still in place.     POSTPROCEDURE DIAGNOSIS:  No inducible SVT despite aggressive atrial pacing with isuprel.     PROCEDURE: After informed consent was obtained, the patient was brought to electrophysiology lab in a fasting, nonsedated state. The right groin was prepped and draped in the usual sterile fashion. Local lidocaine was infiltrated just above the right femoral vein. In the right femoral vein was inserted a 6, 8, 7-Maltese sheath. A decapolar deflectable catheter was placed in the coronary sinus. A CRD-2 catheter was placed from the His. A soft Boby was placed in the right ventricle.     The patient was in sinus rhythm initially. A:A and R:R interval: 815 msec.    AH interval: 96 msec  HV interval: 60 mec,    QRS duration: 93 msec    QT interval: 384 milliseconds      Pacing and sensing was performed from the right atrium, left atrium, and right ventricle for attempted arrhythmia induction. Pacing from the right ventricle revealed VA block. There was no evidence of dual AV node physiology or echo beats.  Burst pacing in the atrial down to 200 msec was performed without any inducible arrhythmia.     Isuprel was given up to 6 mcg/minute with increase in heart rate to 120 bpm.     Pacing and sensing was performed from the atrium via the coronary sinus sheath with burst paacing down to 190 msec. No inducible SVT, a.fib, flutter. Pacing was again performed during washout.      The catheters and sheaths were removed. The patient left the lab in stable condition.     IMPRESSION:    -No inducible arrhythmias despite aggressive burst pacing and isuprel. Plan medical therapy only.   Only multimorphic PAC's with higher doses of isuprel, increasing risk for future A.fib.     PLAN:    -Discharge later today.    -Follow-up with me in 2-3 months.  -Continue to monitor ILR for A.fib, at increased risk due to PAC's with isuprel.  -Prolonged HV interval of 60 msec, low threshold for pacemaker if bradycardia.

## 2017-09-25 NOTE — PROGRESS NOTES
Cath holding summary    Patient escorted to cath holding from waiting area ambulatory, alert and oriented x 4, voicing no complaints. Changed into gown and placed on monitor. NPO since MN. Lab results, med rec and H&P reviewed on chart. PIV x 2 inserted without difficulty. Family to bedside. 1230 patient got up to go home and started to bleed from groin site, pressure held for 15 minutes, hemostasis achieve, groin clean dry and intact.

## 2017-09-25 NOTE — DISCHARGE INSTRUCTIONS
Disposition:  When discharged to home will need follow-up in my office 4 weeks. Please call my office at [phone] if any questions or concerns. Restrictions:  No driving for at least 24 hours after surgery. No heavy lifting > 10 lbs or prolonged standing, walking, or running x 1 week. OK to shower today over incision and remove dressing. Monitor groin for any signs of bleeding and please contact office at [phone] if any issues. There may be some mild bruising which is not unusual.  If any new symptoms develop, such as chest pain, dyspnea, dizziness, please contact office at [phone] immediately. DISCHARGE SUMMARY from Nurse    The following personal items are in your possession at time of discharge:       Visual Aid: At bedside                PATIENT INSTRUCTIONS:    After general anesthesia or intravenous sedation, for 24 hours or while taking prescription Narcotics:  · Limit your activities  · Do not drive and operate hazardous machinery  · Do not make important personal or business decisions  · Do  not drink alcoholic beverages  · If you have not urinated within 8 hours after discharge, please contact your surgeon on call. Report the following to your surgeon:  · Excessive pain, swelling, redness or odor of or around the surgical area  · Temperature over 100.5  · Nausea and vomiting lasting longer than 4 hours or if unable to take medications  · Any signs of decreased circulation or nerve impairment to extremity: change in color, persistent  numbness, tingling, coldness or increase pain  · Any questions        What to do at Home:  Recommended activity: Activity as tolerated,       *  Please give a list of your current medications to your Primary Care Provider. *  Please update this list whenever your medications are discontinued, doses are      changed, or new medications (including over-the-counter products) are added.     *  Please carry medication information at all times in case of emergency situations. These are general instructions for a healthy lifestyle:    No smoking/ No tobacco products/ Avoid exposure to second hand smoke    Surgeon General's Warning:  Quitting smoking now greatly reduces serious risk to your health. Obesity, smoking, and sedentary lifestyle greatly increases your risk for illness    A healthy diet, regular physical exercise & weight monitoring are important for maintaining a healthy lifestyle    You may be retaining fluid if you have a history of heart failure or if you experience any of the following symptoms:  Weight gain of 3 pounds or more overnight or 5 pounds in a week, increased swelling in our hands or feet or shortness of breath while lying flat in bed. Please call your doctor as soon as you notice any of these symptoms; do not wait until your next office visit. Recognize signs and symptoms of STROKE:    F-face looks uneven    A-arms unable to move or move unevenly    S-speech slurred or non-existent    T-time-call 911 as soon as signs and symptoms begin-DO NOT go       Back to bed or wait to see if you get better-TIME IS BRAIN. Warning Signs of HEART ATTACK     Call 911 if you have these symptoms:   Chest discomfort. Most heart attacks involve discomfort in the center of the chest that lasts more than a few minutes, or that goes away and comes back. It can feel like uncomfortable pressure, squeezing, fullness, or pain.  Discomfort in other areas of the upper body. Symptoms can include pain or discomfort in one or both arms, the back, neck, jaw, or stomach.  Shortness of breath with or without chest discomfort.  Other signs may include breaking out in a cold sweat, nausea, or lightheadedness. Don't wait more than five minutes to call 911 - MINUTES MATTER! Fast action can save your life. Calling 911 is almost always the fastest way to get lifesaving treatment.  Emergency Medical Services staff can begin treatment when they arrive -- up to an hour sooner than if someone gets to the hospital by car. The discharge information has been reviewed with the patient and caregiver. The patient and caregiver verbalized understanding. Discharge medications reviewed with the patient and caregiver and appropriate educational materials and side effects teaching were provided. It is normal to feel tired the first couple days. Take it easy and follow the physicians instructions. CHECK THE CATHETER INSERTION SITE DAILY:  You may shower 24 hours after the procedure, remove the bandage during showering. Wash with soap and water and pat dry. Gentle cleaning of the site with soap and water is sufficient, cover with a dry clean dressing or bandage. Do not apply creams or powders to the area. Do not sit in a bathtub or pool of water for 7 days or until wound has completely healed. Temporary bruising and discomfort is normal and may last a few weeks. You may have a  formation of a small lump at the site which may last up to 6 weeks. CALL THE PHYSICIAN:  If the site becomes red, swollen or feels warm to the touch  If there is bleeding or drainage or if there is unusual pain at the groin or down the leg. If there is any bleeding, lie down, apply pressure or have someone apply pressure with a clean cloth until the bleeding stops. If the bleeding continues, call 911 to be transported to the hospital.  DO NOT DRIVE YOURSELF, [place] [address]. Activity:      For the first 24-48 hours or as instructed by the physician:  No lifting, pushing or pulling over 10 pounds and no straining the insertion site. Do not life grocery bags or the garbage can, do not run the vacuum  or  for 7 days. Start with short walks as in the hospital and gradually increase as tolerated each day. It is recommended to walk 30 minutes 5-7 days per week. Follow your physicians instructions on activity.   Avoid walking outside in extremes of heat or cold. Walk inside when it is cold and windy or hot and humid. Things to keep in mind:  No driving for at least 24 hours, or as designated by your physician. Limit the number of times you go up and down the stairs  Take rests and pace yourself with activity. Be careful and do not strain with bowel movements. Medications: Take all medications as prescribed  Call your physician if you have any questions  Keep an updated list of your medications with you at all times and give a list to your physician and pharmacist    Signs and Symptoms:  Be cautious of symptoms of angina or recurrent symptoms such as chest discomfort, unusual shortness of breath or fatigue, palpitations. After Care: Follow up with your physician as instructed. Follow a heart healthy diet with proper portion control, daily stress management, daily exercise, blood pressure and cholesterol control , and smoking cessation.   Patient armband removed and shredded

## 2017-12-28 ENCOUNTER — OFFICE VISIT (OUTPATIENT)
Dept: CARDIOLOGY CLINIC | Age: 72
End: 2017-12-28

## 2017-12-28 VITALS
HEART RATE: 80 BPM | HEIGHT: 70 IN | OXYGEN SATURATION: 97 % | DIASTOLIC BLOOD PRESSURE: 80 MMHG | SYSTOLIC BLOOD PRESSURE: 130 MMHG | WEIGHT: 185 LBS | BODY MASS INDEX: 26.48 KG/M2

## 2017-12-28 DIAGNOSIS — I10 ESSENTIAL HYPERTENSION: ICD-10-CM

## 2017-12-28 DIAGNOSIS — I47.1 SVT (SUPRAVENTRICULAR TACHYCARDIA) (HCC): ICD-10-CM

## 2017-12-28 DIAGNOSIS — Z98.890 S/P ABLATION OPERATION FOR ARRHYTHMIA: ICD-10-CM

## 2017-12-28 DIAGNOSIS — Z95.9 CARDIAC DEVICE IN SITU: Primary | ICD-10-CM

## 2017-12-28 DIAGNOSIS — Z86.79 S/P ABLATION OPERATION FOR ARRHYTHMIA: ICD-10-CM

## 2017-12-28 DIAGNOSIS — I42.9 CARDIOMYOPATHY, UNSPECIFIED TYPE (HCC): ICD-10-CM

## 2017-12-28 DIAGNOSIS — I49.3 PVC (PREMATURE VENTRICULAR CONTRACTION): ICD-10-CM

## 2017-12-28 NOTE — PROGRESS NOTES
History of Present Illness:  A 74 y.o. male here for follow up. He underwent EP study September 2017 due to episodes of SVT and occasional dizziness. Overall he has been doing well but he denies any new chest pain, dyspnea, PND, orthopnea, edema. He has rare PVCs and remote cardiomyopathy. He had a subcutaneous loop recorder April 2016. Impression/Plan:  EP study September 2017 without any inducible sustained arrhythmias. Subcutaneous loop recorder placed April 2016 without events. History of bradycardia with heart rate down to 40's, but nothing requiring a loop recorder. History of PVC's with ablation a number of years ago by Dr. Meena Mejia. History of remote cardiomyopathy improving to normal this year. Gastric outflow obstruction, remote pancreatitis, resolved. Mild aortic insufficiency. History of hypertension. History of pancreatitis. He did not have any inducible arrhythmias during his last EP study. His loop recorder is not showing any events. His blood pressure is a bit higher today and would recommend diet and exercise. He does have a history of bradycardia as well, but nothing sustained on his device. His HV interval was 60 milliseconds during his EP study, but he has never had any syncope. I would continue to monitor. I will tentatively see back in one year. Past Medical History:   Diagnosis Date    Anemia     [facility] failure) [facility]     Bradycardia     Chronic cholecystitis     DVT (deep venous thrombosis) ([facility])     Echocardiogram 05/21/2015    EF 55-60%. No RWMA. Gr 1 DDfx. Septal knob. RVSP 25-30 mmHg. Mild MR. Mild TR. Mild PI.      ED (erectile dysfunction)     Esophageal reflux     Essential hypertension     GERD (gastroesophageal reflux disease)     Hypercholesteremia     Leukocytosis     Nuclear cardiac imaging test 10/29/2010    Slightly mottled perfusion w/o focal reversible defect. EF 42%. Global hypk.   May represent a nonspecific cardiomyopathy.  Pancreatitis     Prostate CA ([facility] [address]     Stage [id number] [place] sum 3+3, s/p DVP 5/24/07      Ventral hernia        Current Outpatient Prescriptions   Medication Sig Dispense Refill    lansoprazole (PREVACID) 30 mg capsule Take  by mouth Daily (before breakfast).  Cholecalciferol, Vitamin D3, (VITAMIN D3) 1,000 unit cap Take  by mouth.  therapeutic multivitamin (THERAGRAN) tablet Take 1 tablet by mouth daily.  polyethylene glycol (MIRALAX) 17 gram packet Take 17 g by mouth as needed.  DOCUSATE SODIUM (COLACE PO) Take  by mouth. Social History   reports that he quit smoking about 33 years ago. He has a 19.00 pack-year smoking history. He does not have any smokeless tobacco history on file. reports that he drinks alcohol. Family History  family history includes Cancer in his mother; Heart Disease in his father; Hypertension in his maternal grandmother and mother. Review of Systems  Except as stated above include:  Constitutional: Negative for fever, chills and malaise/fatigue. HEENT: No congestion or recent URI. Gastrointestinal: No nausea, vomiting, abdominal pain, bloody stools. Pulmonary:  Negative except as stated above. Cardiac:  Negative except as stated above. Musculoskeletal: Negative except as stated above. Neurological:  No localized symptoms. Skin:  Negative except as stated above. Psych:  No mood swings. Endocrine:  No heat/cold intolerance. PHYSICAL EXAM  BP Readings from Last 3 Encounters:   12/28/17 130/80   09/25/17 111/69   08/24/17 130/90     Pulse Readings from Last 3 Encounters:   12/28/17 80   09/25/17 66   08/24/17 74     Wt Readings from Last 3 Encounters:   12/28/17 83.9 kg (185 lb)   09/25/17 80.7 kg (178 lb)   08/24/17 83 kg (183 lb)     General:   Well developed, well groomed. Head/Neck:   No jugular venous distention     No carotid bruits. No evidence of xanthelasma. Lungs:   No respiratory distress.       Clear bilaterally. Heart:    Regular rate and rhythm. Normal S1/S2. Palpation of heart with normal point of maximum impulse. No significant murmurs, rubs or gallops. Abdomen:   Soft and nontender. No palpable abdominal mass or bruits. Extremities:   Intact peripheral pulses. No significant edema. Neurological:   Alert and oriented to person, place, time. No focal neurological deficit visually. Blood Pressure Metric:  eDvora has been given the following recommendations today due to his elevated BP reading: lifestyle modifications to include: dietary sodium restriction.

## 2017-12-28 NOTE — MR AVS SNAPSHOT
Visit Information Date & Time Provider Department Dept. Phone Encounter #  
 12/28/2017  8:20 AM Janell Boogie MD [facility] [id number] Upcoming Health Maintenance Date Due DTaP/Tdap/Td series (1 - Tdap) 6/23/1964 FOBT Q 1 YEAR AGE 50-75 6/23/1993 ZOSTER VACCINE AGE 60> 4/23/2003 GLAUCOMA SCREENING Q2Y 6/23/2008 Pneumococcal 65+ High/Highest Risk (1 of 2 - PCV13) 6/23/2008 MEDICARE YEARLY EXAM 6/23/2008 Influenza Age 5 to Adult 8/1/2017 Allergies as of 12/28/2017  Review Complete On: 12/28/2017 By: Janell Boogie MD  
  
 Severity Noted Reaction Type Reactions Hydrocod-cpm-pe-acetaminophen  06/04/2013    Nausea and Vomiting Current Immunizations  Never Reviewed No immunizations on file. Not reviewed this visit You Were Diagnosed With   
  
 Codes Comments Cardiac device in situ    -  Primary ICD-10-CM: Z95.9 ICD-9-CM: V45.00   
 SVT (supraventricular tachycardia) (HCC)     ICD-10-CM: I47.1 ICD-9-CM: 427.89 S/P ablation operation for arrhythmia     ICD-10-CM: Z98.890, Z86.79 
ICD-9-CM: V45.89 Cardiomyopathy, unspecified type ([facility] [address]     ICD-10-CM: I42.9 ICD-9-CM: 425.4 PVC (premature ventricular contraction)     ICD-10-CM: I49.3 ICD-9-CM: 427.69 Essential hypertension     ICD-10-CM: I10 
ICD-9-CM: 401.9 Vitals BP Pulse Height(growth percentile) Weight(growth percentile) SpO2 BMI  
 130/80 80 5' 10\" (1.778 m) 185 lb (83.9 kg) 97% 26.54 kg/m2 Smoking Status Former Smoker BMI and BSA Data Body Mass Index Body Surface Area  
 26.54 kg/m 2 2.04 m 2 Preferred Pharmacy Pharmacy Name Phone [facility] - [address], [address] [phone] Your Updated Medication List  
  
   
This list is accurate as of: 12/28/17  8:38 AM.  Always use your most recent med list.  
  
  
  
  
 Mj Pitt Take  by mouth. lansoprazole 30 mg capsule Commonly known as:  PREVACID Take  by mouth Daily (before breakfast). MIRALAX 17 gram packet Generic drug:  polyethylene glycol Take 17 g by mouth as needed. therapeutic multivitamin tablet Commonly known as:  [facility] Take 1 tablet by mouth daily. VITAMIN D3 1,000 unit Cap Generic drug:  cholecalciferol Take  by mouth. Patient Instructions Please transmit loop recorder information every 3 months to us (at home). Follow up in 12 months Introducing [facility] SERVICES! Dear Anna Escoto: Thank you for requesting a [employer] account. Our records indicate that you already have an active [employer] account. You can access your account anytime at [email or web address] Did you know that you can access your hospital and ER discharge instructions at any time in [employer]? You can also review all of your test results from your hospital stay or ER visit. Additional Information If you have questions, please visit the Frequently Asked Questions section of the [employer] website at [email or web address] Remember, [employer] is NOT to be used for urgent needs. For medical emergencies, dial 911. Now available from your iPhone and Android! Please provide this summary of care documentation to your next provider. Your primary care clinician is listed as Risa Henriquez. If you have any questions after today's visit, please call [phone].

## 2017-12-28 NOTE — PROGRESS NOTES
1. Have you been to the ER, urgent care clinic since your last visit? Hospitalized since your last visit? Yes, 9/25/17 for ep study     2. Have you seen or consulted any other health care providers outside of the [address] [place] [address] since your last visit? Include any pap smears or colon screening.  No

## 2018-03-28 ENCOUNTER — OFFICE VISIT (OUTPATIENT)
Dept: CARDIOLOGY CLINIC | Age: 73
End: 2018-03-28

## 2018-03-28 DIAGNOSIS — Z98.890 S/P ABLATION OPERATION FOR ARRHYTHMIA: Primary | ICD-10-CM

## 2018-03-28 DIAGNOSIS — Z86.79 S/P ABLATION OPERATION FOR ARRHYTHMIA: Primary | ICD-10-CM

## 2018-03-28 DIAGNOSIS — Z95.9 CARDIAC DEVICE IN SITU: ICD-10-CM

## 2018-07-11 ENCOUNTER — OFFICE VISIT (OUTPATIENT)
Dept: CARDIOLOGY CLINIC | Age: 73
End: 2018-07-11

## 2018-07-11 DIAGNOSIS — Z86.79 S/P ABLATION OPERATION FOR ARRHYTHMIA: Primary | ICD-10-CM

## 2018-07-11 DIAGNOSIS — Z98.890 S/P ABLATION OPERATION FOR ARRHYTHMIA: Primary | ICD-10-CM

## 2018-07-11 DIAGNOSIS — Z95.9 CARDIAC DEVICE IN SITU: ICD-10-CM

## 2018-07-16 NOTE — PROGRESS NOTES
I have personally seen and evaluated the device findings. Interrogation reviewed and I agree with assessment.     Desiree Liu

## 2018-08-06 ENCOUNTER — HOSPITAL ENCOUNTER (OUTPATIENT)
Dept: LAB | Age: 73
Discharge: HOME | End: 2018-08-06
Payer: MEDICARE

## 2018-08-06 DIAGNOSIS — L57.0 ACTINIC KERATOSIS: ICD-10-CM

## 2018-08-06 DIAGNOSIS — Z00.00 ROUTINE GENERAL MEDICAL EXAMINATION AT A HEALTH CARE FACILITY: ICD-10-CM

## 2018-08-06 DIAGNOSIS — E08.9 MALNUTRITION RELATED DIABETES MELLITUS (HCC): ICD-10-CM

## 2018-08-06 DIAGNOSIS — D23.70: ICD-10-CM

## 2018-08-06 DIAGNOSIS — E78.5 HYPERLIPEMIA: ICD-10-CM

## 2018-08-06 DIAGNOSIS — E46 MALNUTRITION RELATED DIABETES MELLITUS (HCC): ICD-10-CM

## 2018-08-06 LAB
ALBUMIN SERPL-MCNC: 4.2 G/DL (ref 3.4–5)
ALBUMIN/GLOB SERPL: 1.1 {RATIO} (ref 0.8–1.7)
ALP SERPL-CCNC: 73 U/L (ref 45–117)
ALT SERPL-CCNC: 22 U/L (ref 16–61)
ANION GAP SERPL CALC-SCNC: 6 MMOL/L (ref 3–18)
AST SERPL-CCNC: 18 U/L (ref 15–37)
BASOPHILS # BLD: 0 K/UL (ref 0–0.1)
BASOPHILS NFR BLD: 1 % (ref 0–2)
BILIRUB SERPL-MCNC: 0.4 MG/DL (ref 0.2–1)
BUN SERPL-MCNC: 12 MG/DL (ref 7–18)
BUN/CREAT SERPL: 11 (ref 12–20)
CALCIUM SERPL-MCNC: 8.9 MG/DL (ref 8.5–10.1)
CHLORIDE SERPL-SCNC: 105 MMOL/L (ref 100–108)
CHOLEST SERPL-MCNC: 196 MG/DL
CO2 SERPL-SCNC: 29 MMOL/L (ref 21–32)
CREAT SERPL-MCNC: 1.06 MG/DL (ref 0.6–1.3)
DIFFERENTIAL METHOD BLD: ABNORMAL
EOSINOPHIL # BLD: 0.1 K/UL (ref 0–0.4)
EOSINOPHIL NFR BLD: 2 % (ref 0–5)
ERYTHROCYTE [DISTWIDTH] IN BLOOD BY AUTOMATED COUNT: 13.5 % (ref 11.6–14.5)
GLOBULIN SER CALC-MCNC: 3.7 G/DL (ref 2–4)
GLUCOSE SERPL-MCNC: 120 MG/DL (ref 74–99)
HCT VFR BLD AUTO: 42.8 % (ref 36–48)
HDLC SERPL-MCNC: 45 MG/DL (ref 40–60)
HDLC SERPL: 4.4 {RATIO} (ref 0–5)
HGB BLD-MCNC: 14.3 G/DL (ref 13–16)
LDLC SERPL CALC-MCNC: 106.6 MG/DL (ref 0–100)
LIPID PROFILE,FLP: ABNORMAL
LYMPHOCYTES # BLD: 1.1 K/UL (ref 0.9–3.6)
LYMPHOCYTES NFR BLD: 25 % (ref 21–52)
MCH RBC QN AUTO: 29.4 PG (ref 24–34)
MCHC RBC AUTO-ENTMCNC: 33.4 G/DL (ref 31–37)
MCV RBC AUTO: 88.1 FL (ref 74–97)
MONOCYTES # BLD: 0.4 K/UL (ref 0.05–1.2)
MONOCYTES NFR BLD: 9 % (ref 3–10)
NEUTS SEG # BLD: 2.6 K/UL (ref 1.8–8)
NEUTS SEG NFR BLD: 63 % (ref 40–73)
PLATELET # BLD AUTO: 139 K/UL (ref 135–420)
PMV BLD AUTO: 11.1 FL (ref 9.2–11.8)
POTASSIUM SERPL-SCNC: 4.2 MMOL/L (ref 3.5–5.5)
PROT SERPL-MCNC: 7.9 G/DL (ref 6.4–8.2)
PSA SERPL-MCNC: <0.1 NG/ML (ref 0–4)
RBC # BLD AUTO: 4.86 M/UL (ref 4.7–5.5)
SODIUM SERPL-SCNC: 140 MMOL/L (ref 136–145)
TRIGL SERPL-MCNC: 222 MG/DL (ref ?–150)
VLDLC SERPL CALC-MCNC: 44.4 MG/DL
WBC # BLD AUTO: 4.2 K/UL (ref 4.6–13.2)

## 2018-08-06 PROCEDURE — 36415 COLL VENOUS BLD VENIPUNCTURE: CPT | Performed by: INTERNAL MEDICINE

## 2018-08-06 PROCEDURE — 83036 HEMOGLOBIN GLYCOSYLATED A1C: CPT | Performed by: INTERNAL MEDICINE

## 2018-08-06 PROCEDURE — 85025 COMPLETE CBC W/AUTO DIFF WBC: CPT | Performed by: INTERNAL MEDICINE

## 2018-08-06 PROCEDURE — 84153 ASSAY OF PSA TOTAL: CPT | Performed by: INTERNAL MEDICINE

## 2018-08-06 PROCEDURE — 80061 LIPID PANEL: CPT | Performed by: INTERNAL MEDICINE

## 2018-08-06 PROCEDURE — 80053 COMPREHEN METABOLIC PANEL: CPT | Performed by: INTERNAL MEDICINE

## 2018-08-08 LAB
EST. AVERAGE GLUCOSE BLD GHB EST-MCNC: 134 MG/DL
HBA1C MFR BLD: 6.3 % (ref 4.2–5.6)

## 2018-10-24 ENCOUNTER — OFFICE VISIT (OUTPATIENT)
Dept: CARDIOLOGY CLINIC | Age: 73
End: 2018-10-24

## 2018-10-24 DIAGNOSIS — Z95.9 CARDIAC DEVICE IN SITU: ICD-10-CM

## 2018-10-24 DIAGNOSIS — Z86.79 S/P ABLATION OPERATION FOR ARRHYTHMIA: Primary | ICD-10-CM

## 2018-10-24 DIAGNOSIS — Z98.890 S/P ABLATION OPERATION FOR ARRHYTHMIA: Primary | ICD-10-CM

## 2018-10-30 ENCOUNTER — OFFICE VISIT (OUTPATIENT)
Dept: INTERNAL MEDICINE CLINIC | Age: 73
End: 2018-10-30

## 2018-10-30 VITALS
WEIGHT: 180 LBS | BODY MASS INDEX: 25.77 KG/M2 | HEIGHT: 70 IN | TEMPERATURE: 96.1 F | SYSTOLIC BLOOD PRESSURE: 147 MMHG | OXYGEN SATURATION: 99 % | HEART RATE: 97 BPM | RESPIRATION RATE: 18 BRPM | DIASTOLIC BLOOD PRESSURE: 99 MMHG

## 2018-10-30 DIAGNOSIS — H93.8X3 CLOGGED EAR, BILATERAL: Primary | ICD-10-CM

## 2018-10-30 DIAGNOSIS — I10 ELEVATED SYSTOLIC BLOOD PRESSURE READING WITH DIAGNOSIS OF HYPERTENSION: ICD-10-CM

## 2018-10-30 NOTE — PROGRESS NOTES
HISTORY OF PRESENT ILLNESS Samia Nelson is a 74 y.o. male. Patient presents with bilateral ear clogging sensation, decreased hearing x 3 weeks, progressively getting worse. He denies any ear trauma,drainage or inserting anything into his ears. He denies any imbalance,dizziness,HA. BPelevated today but he is not taking any medication for HTN. States he used to be treated for HTN about two years ago but was taken off the medication when he lost weight. He is in the process of establishing care with Dr Shefali Bettencourt and already has an appointment lined up. Review of Systems Constitutional: Negative. HENT: Negative. Eyes: Negative. Respiratory: Negative. Cardiovascular: Negative. Gastrointestinal: Negative. Genitourinary: Negative. Musculoskeletal: Negative. Skin: Negative. Neurological: Negative. Psychiatric/Behavioral: Negative. Physical Exam  
Constitutional: He is oriented to person, place, and time. He appears well-developed. HENT:  
Head: Normocephalic. Eyes: Pupils are equal, round, and reactive to light. Neck: Normal range of motion. Cardiovascular: Normal rate. Pulmonary/Chest: Effort normal and breath sounds normal.  
Neurological: He is alert and oriented to person, place, and time. GCS eye subscore is 4. GCS verbal subscore is 5. GCS motor subscore is 6. Skin: Skin is warm. Psychiatric: He has a normal mood and affect. His speech is normal and behavior is normal. Judgment and thought content normal. Cognition and memory are normal.  
 
 
ASSESSMENT and PLAN 
  ICD-10-CM ICD-9-CM 1. Clogged ear, bilateral H93.8X3 388.8 REMOVAL IMPACTED CERUMEN IRRIGATION/LVG UNILAT 2. Elevated systolic blood pressure reading with diagnosis of hypertension I10 401.9 Encounter Diagnoses Name Primary?  Clogged ear, bilateral Yes  Elevated systolic blood pressure reading with diagnosis of hypertension Orders Placed This Encounter  REMOVAL IMPACTED CERUMEN IRRIGATION/LVG UNILAT Orders Placed This Encounter  REMOVAL IMPACTED CERUMEN IRRIGATION/LVG UNILAT Orders Placed This Encounter  REMOVAL IMPACTED CERUMEN IRRIGATION/LVG UNILAT Diagnoses and all orders for this visit: 1. Clogged ear, bilateral 
-     REMOVAL IMPACTED CERUMEN IRRIGATION/LVG UNILAT 2. Elevated systolic blood pressure reading with diagnosis of hypertension Follow-up Disposition: 
Return if symptoms worsen or fail to improve. current treatment plan is effective, no change in therapy 
the following changes in treatment are made: Discussed about HTN treatment with patient who had been on Lisinopril and Lopressor in the past (old records), patient elects to wait and see PCP on the 11/06, states \"I will just wait and discuss with my new PCP\"he seems unenthusiastic about restarting HTN meds,he claims to have been off for 6 years after losing more than 20 lbs.

## 2018-10-30 NOTE — PROGRESS NOTES
Patient presents with bilateral ear clogging sensation, decreased hearing x 3 weeks, progressively getting worse. He denies any ear trauma,drainage or inserting anything into his ears. He denies any imbalance,dizziness,HA. BPelevated today but he is not taking any medication for HTN. States he used to be treated for HTN about two years ago but was taken off the medication when he lost weight. He is in the process of establishing care with Dr Julia Gutiérrez and already has an appointment lined up.

## 2018-10-30 NOTE — PROGRESS NOTES
ROOM # 1 Identified pt with two pt identifiers(name and ). Reviewed record in preparation for visit and have obtained necessary documentation. Chief Complaint Patient presents with  Wax in Ear  
  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 preferred language for health care discussion is english/other. Is the patient using any DME equipment during OV? NO Karin Phlegm. is due for: 
Health Maintenance Due Topic  DTaP/Tdap/Td series (1 - Tdap)  Shingrix Vaccine Age 50> (1 of 2)  GLAUCOMA SCREENING Q2Y  Pneumococcal 65+ High/Highest Risk (1 of 2 - PCV13)  MEDICARE YEARLY EXAM   
 Influenza Age 5 to Adult Health Maintenance reviewed and discussed per provider Please order/place referral if appropriate. Advance Directive: 1. Do you have an advance directive in place? Patient Reply: NO 
 
2. If not, would you like material regarding how to put one in place? NO Coordination of Care: 1. Have you been to the ER, urgent care clinic since your last visit? Hospitalized since your last visit? NO 
 
2. Have you seen or consulted any other health care providers outside of the [facility] since your last visit? Include any pap smears or colon screening. NO Patient is accompanied by self I have received verbal consent from Karin Ceja. to discuss any/all medical information while they are present in the room. Learning Assessment: 
Learning Assessment 2017 PRIMARY LEARNER Patient Patient PRIMARY LANGUAGE ENGLISH ENGLISH  
LEARNER PREFERENCE PRIMARY DEMONSTRATION DEMONSTRATION  
ANSWERED BY Patient Patient RELATIONSHIP SELF SELF Depression Screening: PHQ over the last two weeks 10/30/2018 Little interest or pleasure in doing things Not at all Feeling down, depressed, irritable, or hopeless Not at all Total Score PHQ 2 0 Abuse Screening: 
Abuse Screening Questionnaire 10/30/2018 Do you ever feel afraid of your partner? Suyapa Krishnamurthy Are you in a relationship with someone who physically or mentally threatens you? Suyapa Krishnamurthy Is it safe for you to go home? Rosy Chamorro Fall Risk Fall Risk Assessment, last 12 mths 10/30/2018 Able to walk? Yes Fall in past 12 months? No  
 
I performed a bilateral ear irrigation. Ear wax was removed from both ears. Patient tolerated the procedure well during and after.

## 2018-10-30 NOTE — PATIENT INSTRUCTIONS
High Blood Pressure: Care Instructions Your Care Instructions If your blood pressure is usually above 130/80, you have high blood pressure, or hypertension. That means the top number is 130 or higher or the bottom number is 80 or higher, or both. Despite what a lot of people think, high blood pressure usually doesn't cause headaches or make you feel dizzy or lightheaded. It usually has no symptoms. But it does increase your risk for heart attack, stroke, and kidney or eye damage. The higher your blood pressure, the more your risk increases. Your doctor will give you a goal for your blood pressure. Your goal will be based on your health and your age. Lifestyle changes, such as eating healthy and being active, are always important to help lower blood pressure. You might also take medicine to reach your blood pressure goal. 
Follow-up care is a key part of your treatment and safety. Be sure to make and go to all appointments, and call your doctor if you are having problems. It's also a good idea to know your test results and keep a list of the medicines you take. How can you care for yourself at home? Medical treatment · If you stop taking your medicine, your blood pressure will go back up. You may take one or more types of medicine to lower your blood pressure. Be safe with medicines. Take your medicine exactly as prescribed. Call your doctor if you think you are having a problem with your medicine. · Talk to your doctor before you start taking aspirin every day. Aspirin can help certain people lower their risk of a heart attack or stroke. But taking aspirin isn't right for everyone, because it can cause serious bleeding. · See your doctor regularly. You may need to see the doctor more often at first or until your blood pressure comes down. · If you are taking blood pressure medicine, talk to your doctor before you take decongestants or anti-inflammatory medicine, such as ibuprofen. Some of these medicines can raise blood pressure. · Learn how to check your blood pressure at home. Lifestyle changes · Stay at a healthy weight. This is especially important if you put on weight around the waist. Losing even 10 pounds can help you lower your blood pressure. · If your doctor recommends it, get more exercise. Walking is a good choice. Bit by bit, increase the amount you walk every day. Try for at least 30 minutes on most days of the week. You also may want to swim, bike, or do other activities. · Avoid or limit alcohol. Talk to your doctor about whether you can drink any alcohol. · Try to limit how much sodium you eat to less than 2,300 milligrams (mg) a day. Your doctor may ask you to try to eat less than 1,500 mg a day. · Eat plenty of fruits (such as bananas and oranges), vegetables, legumes, whole grains, and low-fat dairy products. · Lower the amount of saturated fat in your diet. Saturated fat is found in animal products such as milk, cheese, and meat. Limiting these foods may help you lose weight and also lower your risk for heart disease. · Do not smoke. Smoking increases your risk for heart attack and stroke. If you need help quitting, talk to your doctor about stop-smoking programs and medicines. These can increase your chances of quitting for good. When should you call for help? Call 911 anytime you think you may need emergency care. This may mean having symptoms that suggest that your blood pressure is causing a serious heart or blood vessel problem. Your blood pressure may be over 180/120. 
 For example, call 911 if: 
  · You have symptoms of a heart attack. These may include: 
? Chest pain or pressure, or a strange feeling in the chest. 
? Sweating. ? Shortness of breath. ? Nausea or vomiting. ? Pain, pressure, or a strange feeling in the back, neck, jaw, or upper belly or in one or both shoulders or arms. ? Lightheadedness or sudden weakness. ? A fast or irregular heartbeat.  
  · You have symptoms of a stroke. These may include: 
? Sudden numbness, tingling, weakness, or loss of movement in your face, arm, or leg, especially on only one side of your body. ? Sudden vision changes. ? Sudden trouble speaking. ? Sudden confusion or trouble understanding simple statements. ? Sudden problems with walking or balance. ? A sudden, severe headache that is different from past headaches.  
  · You have severe back or belly pain.  
 Do not wait until your blood pressure comes down on its own. Get help right away. 
 Call your doctor now or seek immediate care if: 
  · Your blood pressure is much higher than normal (such as 180/120 or higher), but you don't have symptoms.  
  · You think high blood pressure is causing symptoms, such as: 
? Severe headache. 
? Blurry vision.  
 Watch closely for changes in your health, and be sure to contact your doctor if: 
  · Your blood pressure measures higher than your doctor recommends at least 2 times. That means the top number is higher or the bottom number is higher, or both.  
  · You think you may be having side effects from your blood pressure medicine. Where can you learn more? Go to http://lore-georgie.info/. Enter S728 in the search box to learn more about \"High Blood Pressure: Care Instructions. \" Current as of: December 6, 2017 Content Version: 11.8 © 5231-8977 [employer]. Care instructions adapted under license by [employer] (which disclaims liability or warranty for this information). If you have questions about a medical condition or this instruction, always ask your healthcare professional. [place] [address] any warranty or liability for your use of this information. Low Sodium Diet (2,000 Milligram): Care Instructions Your Care Instructions Too much sodium causes your body to hold on to extra water.  This can raise your blood pressure and force your heart and kidneys to work harder. In very serious cases, this could cause you to be put in the hospital. It might even be life-threatening. By limiting sodium, you will feel better and lower your risk of serious problems. The most common source of sodium is salt. People get most of the salt in their diet from canned, prepared, and packaged foods. Fast food and restaurant meals also are very high in sodium. Your doctor will probably limit your sodium to less than 2,000 milligrams (mg) a day. This limit counts all the sodium in prepared and packaged foods and any salt you add to your food. Follow-up care is a key part of your treatment and safety. Be sure to make and go to all appointments, and call your doctor if you are having problems. It's also a good idea to know your test results and keep a list of the medicines you take. How can you care for yourself at home? Read food labels · Read labels on cans and food packages. The labels tell you how much sodium is in each serving. Make sure that you look at the serving size. If you eat more than the serving size, you have eaten more sodium. · Food labels also tell you the Percent Daily Value for sodium. Choose products with low Percent Daily Values for sodium. · Be aware that sodium can come in forms other than salt, including monosodium glutamate (MSG), sodium citrate, and sodium bicarbonate (baking soda). MSG is often added to Asian food. When you eat out, you can sometimes ask for food without MSG or added salt. Buy low-sodium foods · Buy foods that are labeled \"unsalted\" (no salt added), \"sodium-free\" (less than 5 mg of sodium per serving), or \"low-sodium\" (less than 140 mg of sodium per serving). Foods labeled \"reduced-sodium\" and \"light sodium\" may still have too much sodium. Be sure to read the label to see how much sodium you are getting. · Buy fresh vegetables, or frozen vegetables without added sauces.  Buy low-sodium versions of canned vegetables, soups, and other canned goods. Prepare low-sodium meals · Cut back on the amount of salt you use in cooking. This will help you adjust to the taste. Do not add salt after cooking. One teaspoon of salt has about 2,300 mg of sodium. · Take the salt shaker off the table. · Flavor your food with garlic, lemon juice, onion, vinegar, herbs, and spices. Do not use soy sauce, lite soy sauce, steak sauce, onion salt, garlic salt, celery salt, mustard, or ketchup on your food. · Use low-sodium salad dressings, sauces, and ketchup. Or make your own salad dressings and sauces without adding salt. · Use less salt (or none) when recipes call for it. You can often use half the salt a recipe calls for without losing flavor. Other foods such as rice, pasta, and grains do not need added salt. · Rinse canned vegetables, and cook them in fresh water. This removes somebut not allof the salt. · Avoid water that is naturally high in sodium or that has been treated with water softeners, which add sodium. Call your local water company to find out the sodium content of your water supply. If you buy bottled water, read the label and choose a sodium-free brand. Avoid high-sodium foods · Avoid eating: 
? Smoked, cured, salted, and canned meat, fish, and poultry. ? Ham, vizcaino, hot dogs, and luncheon meats. ? Regular, hard, and processed cheese and regular peanut butter. ? Crackers with salted tops, and other salted snack foods such as pretzels, chips, and salted popcorn. ? Frozen prepared meals, unless labeled low-sodium. ? Canned and dried soups, broths, and bouillon, unless labeled sodium-free or low-sodium. ? Canned vegetables, unless labeled sodium-free or low-sodium. ? [place] fries, pizza, tacos, and other fast foods. ? Pickles, olives, ketchup, and other condiments, especially soy sauce, unless labeled sodium-free or low-sodium. Where can you learn more? Go to http://lore-georgie.info/. Enter Y765 in the search box to learn more about \"Low Sodium Diet (2,000 Milligram): Care Instructions. \" Current as of: March 29, 2018 Content Version: 11.8 © 1300-1473 [employer]. Care instructions adapted under license by [employer] (which disclaims liability or warranty for this information). If you have questions about a medical condition or this instruction, always ask your healthcare professional. [place] [address] any warranty or liability for your use of this information. Earwax Blockage: Care Instructions Your Care Instructions Earwax is a natural substance that protects the ear canal. Normally, earwax drains from the ears and does not cause problems. Sometimes earwax builds up and hardens. Earwax blockage (also called cerumen impaction) can cause some loss of hearing and pain. When wax is tightly packed, you will need to have your doctor remove it. Follow-up care is a key part of your treatment and safety. Be sure to make and go to all appointments, and call your doctor if you are having problems. It's also a good idea to know your test results and keep a list of the medicines you take. How can you care for yourself at home? · Do not try to remove earwax with cotton swabs, fingers, or other objects. This can make the blockage worse and damage the eardrum. · If your doctor recommends that you try to remove earwax at home: ? Soften and loosen the earwax with warm mineral oil. You also can try hydrogen peroxide mixed with an equal amount of room temperature water. Place 2 drops of the fluid, warmed to body temperature, in the ear two times a day for up to 5 days. ? Once the wax is loose and soft, all that is usually needed to remove it from the ear canal is a gentle, warm shower. Direct the water into the ear, then tip your head to let the earwax drain out.  Dry your ear thoroughly with a hair dryer set on low. Hold the dryer several inches from your ear. ? If the warm mineral oil and shower do not work, use an over-the-counter wax softener. Read and follow all instructions on the label. After using the wax softener, use an ear syringe to gently flush the ear. Make sure the flushing solution is body temperature. Cool or hot fluids in the ear can cause dizziness. When should you call for help? Call your doctor now or seek immediate medical care if: 
  · Pus or blood drains from your ear.  
  · Your ears are ringing or feel full.  
  · You have a loss of hearing.  
 Watch closely for changes in your health, and be sure to contact your doctor if: 
  · You have pain or reduced hearing after 1 week of home treatment.  
  · You have any new symptoms, such as nausea or balance problems. Where can you learn more? Go to http://lore-georgie.info/. Enter J330 in the search box to learn more about \"Earwax Blockage: Care Instructions. \" Current as of: November 20, 2017 Content Version: 11.8 © 2650-8565 [employer]. Care instructions adapted under license by [employer] (which disclaims liability or warranty for this information). If you have questions about a medical condition or this instruction, always ask your healthcare professional. [address] any warranty or liability for your use of this information.

## 2018-10-31 NOTE — PROGRESS NOTES
I have personally seen and evaluated the device findings. Interrogation reviewed and I agree with assessment. Kim Frias

## 2018-11-05 ENCOUNTER — DOCUMENTATION ONLY (OUTPATIENT)
Dept: INTERNAL MEDICINE CLINIC | Age: 73
End: 2018-11-05

## 2018-11-05 NOTE — PROGRESS NOTES
Pharmacy Note: Immunization Update Patient: Juanito Joshua (74 y.o., 1945) Patient's immunization history was updated to reflect information contained in the [place] and/or outside immunization/pharmacy records were reconciled within [facility]. Health Maintenance schedule updated when appropriate. Current immunizations now reflect: 
 
  
Immunization History Administered Date(s) Administered  Influenza High Dose Vaccine PF 09/24/2014, 11/30/2015  Pneumococcal Conjugate (PCV-13) 08/06/2018  Pneumococcal Polysaccharide (PPSV-23) 07/08/2013 Tequila Lanier, PharmD, BCACP

## 2018-11-08 ENCOUNTER — OFFICE VISIT (OUTPATIENT)
Dept: INTERNAL MEDICINE CLINIC | Age: 73
End: 2018-11-08

## 2018-11-08 ENCOUNTER — DOCUMENTATION ONLY (OUTPATIENT)
Dept: INTERNAL MEDICINE CLINIC | Age: 73
End: 2018-11-08

## 2018-11-08 VITALS
RESPIRATION RATE: 22 BRPM | BODY MASS INDEX: 26.08 KG/M2 | HEART RATE: 83 BPM | HEIGHT: 70 IN | WEIGHT: 182.2 LBS | DIASTOLIC BLOOD PRESSURE: 95 MMHG | SYSTOLIC BLOOD PRESSURE: 137 MMHG | TEMPERATURE: 96.5 F | OXYGEN SATURATION: 100 %

## 2018-11-08 DIAGNOSIS — C61 MALIGNANT NEOPLASM OF PROSTATE (HCC): ICD-10-CM

## 2018-11-08 DIAGNOSIS — K31.1 PARTIAL GASTRIC OUTLET OBSTRUCTION: ICD-10-CM

## 2018-11-08 DIAGNOSIS — Z95.9 CARDIAC DEVICE IN SITU: ICD-10-CM

## 2018-11-08 DIAGNOSIS — I10 ESSENTIAL HYPERTENSION: ICD-10-CM

## 2018-11-08 DIAGNOSIS — Z23 ENCOUNTER FOR IMMUNIZATION: Primary | ICD-10-CM

## 2018-11-08 NOTE — PATIENT INSTRUCTIONS
DASH Diet: Care Instructions Your Care Instructions The DASH diet is an eating plan that can help lower your blood pressure. DASH stands for Dietary Approaches to Stop Hypertension. Hypertension is high blood pressure. The DASH diet focuses on eating foods that are high in calcium, potassium, and magnesium. These nutrients can lower blood pressure. The foods that are highest in these nutrients are fruits, vegetables, low-fat dairy products, nuts, seeds, and legumes. But taking calcium, potassium, and magnesium supplements instead of eating foods that are high in those nutrients does not have the same effect. The DASH diet also includes whole grains, fish, and poultry. The DASH diet is one of several lifestyle changes your doctor may recommend to lower your high blood pressure. Your doctor may also want you to decrease the amount of sodium in your diet. Lowering sodium while following the DASH diet can lower blood pressure even further than just the DASH diet alone. Follow-up care is a key part of your treatment and safety. Be sure to make and go to all appointments, and call your doctor if you are having problems. It's also a good idea to know your test results and keep a list of the medicines you take. How can you care for yourself at home? Following the DASH diet · Eat 4 to 5 servings of fruit each day. A serving is 1 medium-sized piece of fruit, ½ cup chopped or canned fruit, 1/4 cup dried fruit, or 4 ounces (½ cup) of fruit juice. Choose fruit more often than fruit juice. · Eat 4 to 5 servings of vegetables each day. A serving is 1 cup of lettuce or raw leafy vegetables, ½ cup of chopped or cooked vegetables, or 4 ounces (½ cup) of vegetable juice. Choose vegetables more often than vegetable juice. · Get 2 to 3 servings of low-fat and fat-free dairy each day. A serving is 8 ounces of milk, 1 cup of yogurt, or 1 ½ ounces of cheese. · Eat 6 to 8 servings of grains each day. A serving is 1 slice of bread, 1 ounce of dry cereal, or ½ cup of cooked rice, pasta, or cooked cereal. Try to choose whole-grain products as much as possible. · Limit lean meat, poultry, and fish to 2 servings each day. A serving is 3 ounces, about the size of a deck of cards. · Eat 4 to 5 servings of nuts, seeds, and legumes (cooked dried beans, lentils, and split peas) each week. A serving is 1/3 cup of nuts, 2 tablespoons of seeds, or ½ cup of cooked beans or peas. · Limit fats and oils to 2 to 3 servings each day. A serving is 1 teaspoon of vegetable oil or 2 tablespoons of salad dressing. · Limit sweets and added sugars to 5 servings or less a week. A serving is 1 tablespoon jelly or jam, ½ cup sorbet, or 1 cup of lemonade. · Eat less than 2,300 milligrams (mg) of sodium a day. If you limit your sodium to 1,500 mg a day, you can lower your blood pressure even more. Tips for success · Start small. Do not try to make dramatic changes to your diet all at once. You might feel that you are missing out on your favorite foods and then be more likely to not follow the plan. Make small changes, and stick with them. Once those changes become habit, add a few more changes. · Try some of the following: ? Make it a goal to eat a fruit or vegetable at every meal and at snacks. This will make it easy to get the recommended amount of fruits and vegetables each day. ? Try yogurt topped with fruit and nuts for a snack or healthy dessert. ? Add lettuce, tomato, cucumber, and onion to sandwiches. ? Combine a ready-made pizza crust with low-fat mozzarella cheese and lots of vegetable toppings. Try using tomatoes, squash, spinach, broccoli, carrots, cauliflower, and onions. ? Have a variety of cut-up vegetables with a low-fat dip as an appetizer instead of chips and dip. ? Sprinkle sunflower seeds or chopped almonds over salads.  Or try adding chopped walnuts or almonds to cooked vegetables. ? Try some vegetarian meals using beans and peas. Add garbanzo or kidney beans to salads. Make burritos and tacos with mashed beck beans or black beans. Where can you learn more? Go to http://lore-georgie.info/. Enter K238 in the search box to learn more about \"DASH Diet: Care Instructions. \" Current as of: December 6, 2017 Content Version: 11.8 © 9752-9012 [employer]. Care instructions adapted under license by [employer] (which disclaims liability or warranty for this information). If you have questions about a medical condition or this instruction, always ask your healthcare professional. [address] any warranty or liability for your use of this information.

## 2018-11-08 NOTE — PROGRESS NOTES
HISTORY OF PRESENT ILLNESS Lorna Pickett is a 74 y.o. male. 75 yo male here to establish care, f/u of HTN. He reports h/o white coat HTN. BP is controlled without medication when he checks at home. No CP, SOB. H/o gallstone pancreatitis in 5755 with complicated hospitalization, pseudocyst. H/o fistula. Ventral hernia repair in 2012. H/o prostate CA with prostatectomy. Some nocturia. Takes PPI for belching sx. Denies heartburn. Had been on BID, now q day Feels well at this time. Needs flu shot. Review of Systems Constitutional: Negative for chills, fever and weight loss. HENT: Negative for congestion and ear pain. Eyes: Negative for blurred vision and pain. Respiratory: Negative for cough and shortness of breath. Cardiovascular: Negative for chest pain, palpitations and leg swelling. Gastrointestinal: Negative for nausea and vomiting. Genitourinary: Negative for frequency and urgency. Musculoskeletal: Negative for joint pain and myalgias. Skin: Negative for itching and rash. Neurological: Negative for dizziness, tingling and headaches. Psychiatric/Behavioral: Negative for depression. The patient is not nervous/anxious. Past Medical History:  
Diagnosis Date  Anemia  [facility]F [facility]acute renal failure) ([facility]  Bradycardia  Chronic cholecystitis  [facility]  Echocardiogram 05/21/2015 EF 55-60%. No RWMA. Gr 1 DDfx. Septal knob. RVSP 25-30 mmHg. Mild MR. Mild TR. Mild PI.    
 ED (erectile dysfunction)  Esophageal reflux  Essential hypertension  GERD (gastroesophageal reflux disease)  Hypercholesteremia  Leukocytosis  Nuclear cardiac imaging test 10/29/2010 Slightly mottled perfusion w/o focal reversible defect. EF 42%. Global hypk. May represent a nonspecific cardiomyopathy.  Pancreatitis  Pancreatitis  Prostate CA ([facility] Stage [id number] Junaid sum 3+3, s/p DVP 5/24/07  Ventral hernia Past Surgical History:  
Procedure Laterality Date  HX CHOLECYSTECTOMY  11/18/10  
 HX CYST REMOVAL  08/15/12 Right Back  HX HERNIA REPAIR  2012  HX ORTHOPAEDIC    
 HX PROSTATECTOMY  07 Stage [id number] Sasabe sum 3+3  
 HX TRACHEOSTOMY  9/7/10 Current Outpatient Medications on File Prior to Visit Medication Sig Dispense Refill  lansoprazole (PREVACID) 30 mg capsule Take  by mouth Daily (before breakfast).  Cholecalciferol, Vitamin D3, (VITAMIN D3) 1,000 unit cap Take  by mouth.  therapeutic multivitamin (THERAGRAN) tablet Take 1 tablet by mouth daily.  polyethylene glycol (MIRALAX) 17 gram packet Take 17 g by mouth as needed.  DOCUSATE SODIUM (COLACE PO) Take  by mouth. No current facility-administered medications on file prior to visit. Allergies Allergen Reactions  Hydrocod-Cpm-Pe-Acetaminophen Nausea and Vomiting Family History Problem Relation Age of Onset  Cancer Mother Pancreatic  Hypertension Mother  Heart Disease Father  Hypertension Maternal Grandmother Social History Tobacco Use  Smoking status: Former Smoker Packs/day: 1.00 Years: 19.00 Pack years: 19.00 Last attempt to quit: 1984 Years since quittin.5  Smokeless tobacco: Never Used Substance Use Topics  Alcohol use: Yes Comment: very seldom  Drug use: No  
 
Physical Exam  
Constitutional: He appears well-developed and well-nourished. No distress. BP (!) 137/95 (BP 1 Location: Left arm, BP Patient Position: Sitting)   Pulse 83   Temp 96.5 °F (35.8 °C) (Oral)   Resp 22   Ht 5' 10\" (1.778 m)   Wt 182 lb 3.2 oz (82.6 kg)   SpO2 100%   BMI 26.14 kg/m² HENT:  
Residual cerumen debris B Eyes: EOM are normal. Right eye exhibits no discharge. Left eye exhibits no discharge. No scleral icterus. Neck: Neck supple. Cardiovascular: Normal rate, regular rhythm and normal heart sounds. Exam reveals no gallop and no friction rub. No murmur heard. Pulmonary/Chest: Effort normal and breath sounds normal. No respiratory distress. He has no wheezes. He has no rales. Abdominal: Soft. He exhibits no distension. There is no tenderness. There is no rebound and no guarding. Musculoskeletal: He exhibits no edema or tenderness. Lymphadenopathy:  
  He has no cervical adenopathy. Neurological: He is alert. He exhibits normal muscle tone. Skin: Skin is warm and dry. Psychiatric: He has a normal mood and affect. Lab Results Component Value Date/Time Sodium 140 08/06/2018 10:08 AM  
 Potassium 4.2 08/06/2018 10:08 AM  
 Chloride 105 08/06/2018 10:08 AM  
 CO2 29 08/06/2018 10:08 AM  
 Anion gap 6 08/06/2018 10:08 AM  
 Glucose 120 (H) 08/06/2018 10:08 AM  
 BUN 12 08/06/2018 10:08 AM  
 Creatinine 1.06 08/06/2018 10:08 AM  
 BUN/Creatinine ratio 11 (L) 08/06/2018 10:08 AM  
 GFR est AA >60 08/06/2018 10:08 AM  
 GFR est non-AA >60 08/06/2018 10:08 AM  
 Calcium 8.9 08/06/2018 10:08 AM  
 Bilirubin, total 0.4 08/06/2018 10:08 AM  
 AST (SGOT) 18 08/06/2018 10:08 AM  
 Alk. phosphatase 73 08/06/2018 10:08 AM  
 Protein, total 7.9 08/06/2018 10:08 AM  
 Albumin 4.2 08/06/2018 10:08 AM  
 Globulin 3.7 08/06/2018 10:08 AM  
 A-G Ratio 1.1 08/06/2018 10:08 AM  
 ALT (SGPT) 22 08/06/2018 10:08 AM  
 
Lab Results Component Value Date/Time WBC 4.2 (L) 08/06/2018 10:08 AM  
 Hemoglobin, POC 6.8 (L) 11/18/2010 11:05 AM  
 HGB 14.3 08/06/2018 10:08 AM  
 Hematocrit, POC 20 (L) 11/18/2010 11:05 AM  
 HCT 42.8 08/06/2018 10:08 AM  
 PLATELET 194 47/69/1537 10:08 AM  
 MCV 88.1 08/06/2018 10:08 AM  
 
Lab Results Component Value Date/Time  
 Prostate Specific Ag <0.1 08/06/2018 10:08 AM  
 Prostate Specific Ag <0.03 06/04/2013 Prostate Specific Ag <0.1 04/29/2011 Lab Results Component Value Date/Time Cholesterol, total 196 08/06/2018 10:08 AM  
 HDL Cholesterol 45 08/06/2018 10:08 AM  
 LDL, calculated 106.6 (H) 08/06/2018 10:08 AM  
 VLDL, calculated 44.4 08/06/2018 10:08 AM  
 Triglyceride 222 (H) 08/06/2018 10:08 AM  
 CHOL/HDL Ratio 4.4 08/06/2018 10:08 AM  
 
Lab Results Component Value Date/Time Hemoglobin A1c 6.3 (H) 08/06/2018 10:08 AM  
 
ASSESSMENT and PLAN 
  ICD-10-CM ICD-9-CM 1. Encounter for immunization Z23 V03.89 INFLUENZA VACCINE INACTIVATED (IIV), SUBUNIT, ADJUVANTED, IM 2. Essential hypertension I10 401.9 3. Partial gastric outlet obstruction K31.1 537.0 4. Malignant neoplasm of prostate ([facility] C61 185   
5. Cardiac device in situ Z95.9 V45.00 Will review old records Continue with current medication. Could consider tapering PPI in the future. Monitor BP at home.

## 2018-11-08 NOTE — PROGRESS NOTES
ROOM # 16 Identified pt with two pt identifiers(name and ). Reviewed record in preparation for visit and have obtained necessary documentation. Chief Complaint Patient presents with [facility] Establish Care  Immunization/Injection  
  flu shot Celina Boudreaux [employer]. preferred language for health care discussion is english/other. Is the patient using any DME equipment during OV? NO Alyssa Reese. is due for: 
Health Maintenance Due Topic  DTaP/Tdap/Td series (1 - Tdap)  Shingrix Vaccine Age 50> (1 of 2)  GLAUCOMA SCREENING Q2Y  MEDICARE YEARLY EXAM   
 Influenza Age 5 to Adult Health Maintenance reviewed and discussed per provider Please order/place referral if appropriate. Advance Directive: 1. Do you have an advance directive in place? Patient Reply: NO 
 
2. If not, would you like material regarding how to put one in place? NO Coordination of Care: 1. Have you been to the ER, urgent care clinic since your last visit? Hospitalized since your last visit? NO 
 
2. Have you seen or consulted any other health care providers outside of the [address] [place] [address] since your last visit? Include any pap smears or colon screening. NO Patient is accompanied by spouse I have received verbal consent from Alyssa Reese. to discuss any/all medical information while they are present in the room. Learning Assessment: 
Learning Assessment 2017 PRIMARY LEARNER Patient Patient PRIMARY LANGUAGE ENGLISH ENGLISH  
LEARNER PREFERENCE PRIMARY DEMONSTRATION DEMONSTRATION  
ANSWERED BY Patient Patient RELATIONSHIP SELF SELF Depression Screening: PHQ over the last two weeks 2018 10/30/2018 Little interest or pleasure in doing things Not at all Not at all Feeling down, depressed, irritable, or hopeless Not at all Not at all Total Score PHQ 2 0 0 Abuse Screening: 
Abuse Screening Questionnaire 10/30/2018 Do you ever feel afraid of your partner? Sri Clemons Are you in a relationship with someone who physically or mentally threatens you? Sri Clemons Is it safe for you to go home? Kerry Delgadillo Fall Risk Fall Risk Assessment, last 12 mths 11/8/2018 10/30/2018 Able to walk? Yes Yes Fall in past 12 months?  No No

## 2018-12-24 ENCOUNTER — OFFICE VISIT (OUTPATIENT)
Dept: INTERNAL MEDICINE CLINIC | Age: 73
End: 2018-12-24

## 2018-12-24 VITALS
DIASTOLIC BLOOD PRESSURE: 78 MMHG | TEMPERATURE: 97.6 F | SYSTOLIC BLOOD PRESSURE: 121 MMHG | BODY MASS INDEX: 25.71 KG/M2 | HEART RATE: 87 BPM | RESPIRATION RATE: 18 BRPM | HEIGHT: 70 IN | WEIGHT: 179.6 LBS | OXYGEN SATURATION: 100 %

## 2018-12-24 DIAGNOSIS — R19.7 DIARRHEA, UNSPECIFIED TYPE: Primary | ICD-10-CM

## 2018-12-24 NOTE — PROGRESS NOTES
ROOM # 16  Identified pt with two pt identifiers(name and ). Reviewed record in preparation for visit and have obtained necessary documentation. Chief Complaint   Patient presents with    Diarrhea      1 [facility]. preferred language for health care discussion is english/other. Is the patient using any DME equipment during OV? NO    1 [facility]. is due for:  Health Maintenance Due   Topic    DTaP/Tdap/Td series (1 - Tdap)    Shingrix Vaccine Age 50> (1 of 2)    GLAUCOMA SCREENING Q2Y     AAA Screening 73-69 YO Male Smoking Patients    [address] Maintenance reviewed and discussed per provider  Please order/place referral if appropriate. Advance Directive:  1. Do you have an advance directive in place? Patient Reply: NO    2. If not, would you like material regarding how to put one in place? NO    Coordination of Care:  1. Have you been to the ER, urgent care clinic since your last visit? Hospitalized since your last visit? NO    2. Have you seen or consulted any other health care providers outside of the [facility] since your last visit? Include any pap smears or colon screening. NO    Patient is accompanied by spouse I have received verbal consent from 1 [facility]. to discuss any/all medical information while they are present in the room.     Learning Assessment:  Learning Assessment 2017   PRIMARY LEARNER Patient Patient   PRIMARY LANGUAGE ENGLISH ENGLISH   LEARNER PREFERENCE PRIMARY DEMONSTRATION DEMONSTRATION   ANSWERED BY Patient Patient   RELATIONSHIP SELF SELF     Depression Screening:  PHQ over the last two weeks 2018 2018 10/30/2018   Little interest or pleasure in doing things Not at all Not at all Not at all   Feeling down, depressed, irritable, or hopeless Not at all Not at all Not at all   Total Score PHQ 2 0 0 0     Abuse Screening:  Abuse Screening Questionnaire 10/30/2018   Do you ever feel afraid of your partner? N   Are you in a relationship with someone who physically or mentally threatens you? N   Is it safe for you to go home? Y     Fall Risk  Fall Risk Assessment, last 12 mths 12/24/2018 11/8/2018 10/30/2018   Able to walk? Yes Yes Yes   Fall in past 12 months?  No No No

## 2018-12-24 NOTE — PROGRESS NOTES
HISTORY OF PRESENT ILLNESS  Jenelle Black is a 74 y.o. male. 75 yo male here for evaluation of diarrhea x 4 days. He woke with sx which have been watery stools ~ 4 times on first day. He thought they were improved but exacerbated after eating his cereal with milk that seemed spoiled the following day. He reports 4 episodes so far today. No fevers, chills. No blood or mucous in stools. No nausea or vomiting. No abdominal pain/cramping. Has some relief with imodium. Has been trying to stay hydrated but does not like the taste of gatorade or pedialyte. Feels a little dizzy with standing and tired, but otherwise okay. Denies sick contacts. One daughter with gastroenteritis recently but they have not been together. Review of Systems   Constitutional: Negative for chills, diaphoresis, fever and weight loss. HENT: Negative for congestion and ear pain. Eyes: Negative for blurred vision and pain. Respiratory: Negative for cough and shortness of breath. Cardiovascular: Negative for chest pain, palpitations and leg swelling. Gastrointestinal: Positive for diarrhea. Negative for abdominal pain, blood in stool, melena, nausea and vomiting. Genitourinary: Negative for frequency and urgency. Musculoskeletal: Negative for joint pain and myalgias. Skin: Negative for itching and rash. Neurological: Negative for dizziness, tingling, weakness and headaches. Psychiatric/Behavioral: Negative for depression. The patient is not nervous/anxious. Past Medical History:   Diagnosis Date    Anemia     [facility]     Bradycardia     Chronic cholecystitis     DVT (deep venous thrombosis) ([facility]     Echocardiogram 05/21/2015    EF 55-60%. No RWMA. Gr 1 DDfx. Septal knob. RVSP 25-30 mmHg. Mild MR. Mild TR.   Mild PI.      ED (erectile dysfunction)     Esophageal reflux     Essential hypertension     GERD (gastroesophageal reflux disease)     Hypercholesteremia     Leukocytosis     Nuclear cardiac imaging test 10/29/2010    Slightly mottled perfusion w/o focal reversible defect. EF 42%. Global hypk. May represent a nonspecific cardiomyopathy.  Pancreatitis     Pancreatitis     Prostate CA ([facility]     Stage [id number] Ramsay sum 3+3, s/p DVP 5/24/07      Ventral hernia      Current Outpatient Medications on File Prior to Visit   Medication Sig Dispense Refill    lansoprazole (PREVACID) 30 mg capsule Take  by mouth Daily (before breakfast).  Cholecalciferol, Vitamin D3, (VITAMIN D3) 1,000 unit cap Take  by mouth.  therapeutic multivitamin (THERAGRAN) tablet Take 1 tablet by mouth daily.  polyethylene glycol (MIRALAX) 17 gram packet Take 17 g by mouth as needed.  DOCUSATE SODIUM (COLACE PO) Take  by mouth. No current facility-administered medications on file prior to visit. Supine /80, HR 88, seated /80, HR 92, standing /80. Physical Exam   Constitutional: He appears well-developed and well-nourished. No distress. /78 (BP 1 Location: Right arm, BP Patient Position: Sitting)   Pulse 87   Temp 97.6 °F (36.4 °C) (Oral)   Resp 18   Ht 5' 10\" (1.778 m)   Wt 179 lb 9.6 oz (81.5 kg)   SpO2 100%   BMI 25.77 kg/m²      Eyes: EOM are normal. Right eye exhibits no discharge. Left eye exhibits no discharge. No scleral icterus. Neck: Neck supple. Cardiovascular: Normal rate, regular rhythm and normal heart sounds. Exam reveals no gallop and no friction rub. No murmur heard. Pulmonary/Chest: Effort normal and breath sounds normal. No respiratory distress. He has no wheezes. He has no rales. Abdominal: Soft. He exhibits no distension. There is no tenderness. There is no rebound and no guarding. Musculoskeletal: He exhibits no edema or tenderness. Lymphadenopathy:     He has no cervical adenopathy. Neurological: He is alert. He exhibits normal muscle tone. Skin: Skin is warm and dry.    Psychiatric: He has a normal mood and affect. Lab Results   Component Value Date/Time    Sodium 140 08/06/2018 10:08 AM    Potassium 4.2 08/06/2018 10:08 AM    Chloride 105 08/06/2018 10:08 AM    CO2 29 08/06/2018 10:08 AM    Anion gap 6 08/06/2018 10:08 AM    Glucose 120 (H) 08/06/2018 10:08 AM    BUN 12 08/06/2018 10:08 AM    Creatinine 1.06 08/06/2018 10:08 AM    BUN/Creatinine ratio 11 (L) 08/06/2018 10:08 AM    GFR est AA >60 08/06/2018 10:08 AM    GFR est non-AA >60 08/06/2018 10:08 AM    Calcium 8.9 08/06/2018 10:08 AM    Bilirubin, total 0.4 08/06/2018 10:08 AM    AST (SGOT) 18 08/06/2018 10:08 AM    Alk. phosphatase 73 08/06/2018 10:08 AM    Protein, total 7.9 08/06/2018 10:08 AM    Albumin 4.2 08/06/2018 10:08 AM    Globulin 3.7 08/06/2018 10:08 AM    A-G Ratio 1.1 08/06/2018 10:08 AM    ALT (SGPT) 22 08/06/2018 10:08 AM     ASSESSMENT and PLAN    ICD-10-CM ICD-9-CM    1. Diarrhea, unspecified type R19.7 787.91      Will continue with po hydration. Discussed diet. Discussed return precautions and provided info on AVS. Can continue with imodium which has been helpful if no abdominal pain. Wishes to hold on labs today but will consider if sx persist/worsen.

## 2018-12-24 NOTE — PATIENT INSTRUCTIONS

## 2018-12-27 ENCOUNTER — CLINICAL SUPPORT (OUTPATIENT)
Dept: CARDIOLOGY CLINIC | Age: 73
End: 2018-12-27

## 2018-12-27 ENCOUNTER — OFFICE VISIT (OUTPATIENT)
Dept: CARDIOLOGY CLINIC | Age: 73
End: 2018-12-27

## 2018-12-27 VITALS
BODY MASS INDEX: 25.62 KG/M2 | WEIGHT: 179 LBS | HEIGHT: 70 IN | HEART RATE: 90 BPM | DIASTOLIC BLOOD PRESSURE: 70 MMHG | SYSTOLIC BLOOD PRESSURE: 116 MMHG | OXYGEN SATURATION: 98 %

## 2018-12-27 DIAGNOSIS — Z95.9 CARDIAC DEVICE IN SITU: ICD-10-CM

## 2018-12-27 DIAGNOSIS — I42.9 CARDIOMYOPATHY, UNSPECIFIED TYPE (HCC): ICD-10-CM

## 2018-12-27 DIAGNOSIS — Z86.79 S/P ABLATION OPERATION FOR ARRHYTHMIA: Primary | ICD-10-CM

## 2018-12-27 DIAGNOSIS — Z98.890 S/P ABLATION OPERATION FOR ARRHYTHMIA: Primary | ICD-10-CM

## 2018-12-27 DIAGNOSIS — I47.1 SVT (SUPRAVENTRICULAR TACHYCARDIA) (HCC): ICD-10-CM

## 2018-12-27 DIAGNOSIS — R42 DIZZY SPELLS: ICD-10-CM

## 2018-12-27 DIAGNOSIS — I10 ESSENTIAL HYPERTENSION: ICD-10-CM

## 2018-12-27 NOTE — PROGRESS NOTES
Stevo Candido. presents today for   Chief Complaint   Patient presents with    SVT     1 year follow up     Dizziness     sometimes        Stevo Rey. preferred language for health care discussion is english/other. Is someone accompanying this pt? Wife     Is the patient using any DME equipment during OV? No     Depression Screening:  PHQ over the last two weeks 12/24/2018   Little interest or pleasure in doing things Not at all   Feeling down, depressed, irritable, or hopeless Not at all   Total Score PHQ 2 0       Learning Assessment:  Learning Assessment 8/24/2017   PRIMARY LEARNER Patient   PRIMARY LANGUAGE ENGLISH   LEARNER PREFERENCE PRIMARY DEMONSTRATION   ANSWERED BY Patient   RELATIONSHIP SELF       Abuse Screening:  Abuse Screening Questionnaire 10/30/2018   Do you ever feel afraid of your partner? N   Are you in a relationship with someone who physically or mentally threatens you? N   Is it safe for you to go home? Y       Fall Risk  Fall Risk Assessment, last 12 mths 12/24/2018   Able to walk? Yes   Fall in past 12 months? No       Pt currently taking Anticoagulant therapy? No     Coordination of Care:  1. Have you been to the ER, urgent care clinic since your last visit? Hospitalized since your last visit? No     2. Have you seen or consulted any other health care providers outside of the [address] [place] [address] since your last visit? Include any pap smears or colon screening.  No

## 2018-12-27 NOTE — PROGRESS NOTES
History of Present Illness:   A 74 y.o. male here for follow up. Last year he went to [facility] and in the coming months he is planning on going to [place] and [facility]. He goes with a [employer] group for about a month. He has been doing well from a cardiac standpoint. He denies any new chest pain, dyspnea, PND, orthopnea, edema. He did have some mild dizziness when he was having some excessive diarrhea and stomach flu symptoms over the past week, but this has improved.      Impression/Plan:  Subcutaneous loop recorder placed April 2016 without events. EP study September 2017 without inducible sustained arrhythmias. He did have a short run of possible atrial tachycardia to a minute. History of bradycardia with heart rate down to 40's, but nothing requiring  Intervention and nothing found on loop recorder. History of PVC's with ablation a number of years ago by Dr. Keena Madrigal. History of remote cardiomyopathy improving to normal last year. History of gastric outflow obstruction, remote pancreatitis, resolved. Mild aortic insufficiency. History of hypertension, controlled. At this time his device does not show any events and he does not show any symptoms. He continues to be active without limitations and doing quite well. Previous EP study showed HV interval 60 milliseconds, but he has not had any recurrent symptoms to suggest bradycardia or heart block. I will continue with regular device checks and see him back in one year. I will consider explanting the device at that point if he is stable. Past Medical History:   Diagnosis Date    Anemia     [facility]     Bradycardia     Chronic cholecystitis     [facility]     Echocardiogram 05/21/2015    EF 55-60%. No RWMA. Gr 1 DDfx. Septal knob. RVSP 25-30 mmHg. Mild MR. Mild TR.   Mild PI.      ED (erectile dysfunction)     Esophageal reflux     Essential hypertension     GERD (gastroesophageal reflux disease)     Hypercholesteremia     Leukocytosis     Nuclear cardiac imaging test 10/29/2010    Slightly mottled perfusion w/o focal reversible defect. EF 42%. Global hypk. May represent a nonspecific cardiomyopathy.  Pancreatitis     Pancreatitis     Prostate CA ([facility]     Stage [id number] Junaid sum 3+3, s/p DVP 5/24/07      Ventral hernia        Current Outpatient Medications   Medication Sig Dispense Refill    lansoprazole (PREVACID) 30 mg capsule Take  by mouth Daily (before breakfast).  Cholecalciferol, Vitamin D3, (VITAMIN D3) 1,000 unit cap Take  by mouth.  therapeutic multivitamin (THERAGRAN) tablet Take 1 tablet by mouth daily.  polyethylene glycol (MIRALAX) 17 gram packet Take 17 g by mouth as needed.  DOCUSATE SODIUM (COLACE PO) Take  by mouth. Social History   reports that he quit smoking about 34 years ago. He has a 19.00 pack-year smoking history. he has never used smokeless tobacco.   reports that he drinks alcohol. Family History  family history includes Cancer in his mother; Heart Disease in his father; Hypertension in his maternal grandmother and mother. Review of Systems  Except as stated above include:  Constitutional: Negative for fever, chills and malaise/fatigue. HEENT: No congestion or recent URI. Gastrointestinal: No nausea, vomiting, abdominal pain, bloody stools. Pulmonary:  Negative except as stated above. Cardiac:  Negative except as stated above. Musculoskeletal: Negative except as stated above. Neurological:  No localized symptoms. Skin:  Negative except as stated above. Psych:  Negative except as stated above. Endocrine:  Negative except as stated above.     PHYSICAL EXAM  BP Readings from Last 3 Encounters:   12/24/18 121/78   11/08/18 (!) 137/95   10/30/18 (!) 147/99     Pulse Readings from Last 3 Encounters:   12/24/18 87   11/08/18 83   10/30/18 97     Wt Readings from Last 3 Encounters:   12/24/18 81.5 kg (179 lb 9.6 oz) 11/08/18 82.6 kg (182 lb 3.2 oz)   10/30/18 81.6 kg (180 lb)     General:   Well developed, well groomed. Head/Neck:   No jugular venous distention     No carotid bruits. No evidence of xanthelasma. Lungs:   No respiratory distress. Clear bilaterally. Heart:    Regular rate and rhythm. Normal S1/S2. Palpation of heart with normal point of maximum impulse. No significant murmurs, rubs or gallops. Abdomen:   Soft and nontender. No palpable abdominal mass or bruits. Extremities:   Intact peripheral pulses. No significant edema. Neurological:   Alert and oriented to person, place, time. No focal neurological deficit visually.   Skin:   No obvious rash    Blood Pressure Metric:  Devora has been given the following recommendations today due to his elevated BP reading: monitor

## 2019-01-03 NOTE — PROGRESS NOTES
I have personally seen and evaluated the device findings. Interrogation reviewed and I agree with assessment. Lucero Neal

## 2019-01-18 ENCOUNTER — TELEPHONE (OUTPATIENT)
Dept: INTERNAL MEDICINE CLINIC | Age: 74
End: 2019-01-18

## 2019-01-18 NOTE — TELEPHONE ENCOUNTER
----- Message from Gilmar Tena MD sent at 1/18/2019  9:43 AM EST ----- Please contact pt to schedule his Medicare Wellness Visit. I would rather not wait for this until his scheduled f/u.

## 2019-01-18 NOTE — LETTER
2/6/2019 11:45 AM 
 
Mr. [phone] [id number] I hope this letter finds you well. I am a Licensed Practical Nurse with [employer], we have attempted to contact you on several occasions unsuccessfully. Please contact our office at the number listed above to schedule a Medicare Wellness Visit. As always, Your good health is important to us and our goal is to be your partner in life-long wellness. Sincerely, Noe Flores LPN

## 2019-01-18 NOTE — TELEPHONE ENCOUNTER
Attempted to contact patient this morning to schedule Medicare Wellness Visit per provider request. No answer; left voicemail requesting patient return call to office at [phone].

## 2019-02-06 NOTE — TELEPHONE ENCOUNTER
Attempted to contact patient this morning. No answer; left voicemail requesting patient return call to office at [phone] to schedule an Medicare Wellness Visit. Second unsuccessful attempt to contact patient. Letter generated.

## 2019-07-15 ENCOUNTER — OFFICE VISIT (OUTPATIENT)
Dept: INTERNAL MEDICINE CLINIC | Age: 74
End: 2019-07-15

## 2019-07-15 VITALS
RESPIRATION RATE: 18 BRPM | HEART RATE: 70 BPM | HEIGHT: 70 IN | OXYGEN SATURATION: 99 % | BODY MASS INDEX: 26.6 KG/M2 | SYSTOLIC BLOOD PRESSURE: 142 MMHG | TEMPERATURE: 95.8 F | DIASTOLIC BLOOD PRESSURE: 78 MMHG | WEIGHT: 185.8 LBS

## 2019-07-15 DIAGNOSIS — I10 ESSENTIAL HYPERTENSION: Primary | ICD-10-CM

## 2019-07-15 DIAGNOSIS — R41.89 SUBJECTIVE MEMORY COMPLAINTS: ICD-10-CM

## 2019-07-15 NOTE — PROGRESS NOTES
HISTORY OF PRESENT ILLNESS  Swapnil Lane is a 66 y.o. male. Here for f/u of HTN. Feels well. His wife notes he has had some trouble with memory that she has noticed increasing over the past several months. He denies issues other than noticing he goes to rooms and forgets what he is there for. His wife notes he was always good about putting things on their calendar but has been forgettting to put things on lately. She wonders if sx started after his prolonged ICU stay (4 months) a few years ago. H/o white coat HTN. BP well controlled when he checks at home. No CP, SOB. Review of Systems   Constitutional: Negative for chills, fever and weight loss. HENT: Negative for congestion and ear pain. Eyes: Negative for blurred vision and pain. Respiratory: Negative for cough and shortness of breath. Cardiovascular: Negative for chest pain, palpitations and leg swelling. Gastrointestinal: Negative for nausea and vomiting. Genitourinary: Negative for frequency and urgency. Musculoskeletal: Negative for joint pain and myalgias. Skin: Negative for itching and rash. Neurological: Negative for dizziness, tingling and headaches. Psychiatric/Behavioral: Negative for depression. The patient is not nervous/anxious. Past Medical History:   Diagnosis Date    Anemia     [facility]     Bradycardia     Chronic cholecystitis     [facility]) ([facility])     Echocardiogram 05/21/2015    EF 55-60%. No RWMA. Gr 1 DDfx. Septal knob. RVSP 25-30 mmHg. Mild MR. Mild TR. Mild PI.      ED (erectile dysfunction)     Esophageal reflux     Essential hypertension     GERD (gastroesophageal reflux disease)     Hypercholesteremia     Leukocytosis     Nuclear cardiac imaging test 10/29/2010    Slightly mottled perfusion w/o focal reversible defect. EF 42%. Global hypk. May represent a nonspecific cardiomyopathy.     Pancreatitis     Pancreatitis     Prostate CA ([facility] [address]     Stage [id number] Junaid sum 3+3, s/p DVP 5/24/07      Ventral hernia      Current Outpatient Medications on File Prior to Visit   Medication Sig Dispense Refill    lansoprazole (PREVACID) 30 mg capsule Take  by mouth Daily (before breakfast).  Cholecalciferol, Vitamin D3, (VITAMIN D3) 1,000 unit cap Take  by mouth.  therapeutic multivitamin (THERAGRAN) tablet Take 1 tablet by mouth daily.  polyethylene glycol (MIRALAX) 17 gram packet Take 17 g by mouth as needed.  DOCUSATE SODIUM (COLACE PO) Take  by mouth. No current facility-administered medications on file prior to visit. Physical Exam   Constitutional: He appears well-developed and well-nourished. No distress. /78 (BP 1 Location: Left arm, BP Patient Position: Sitting)   Pulse 70   Temp 95.8 °F (35.4 °C) (Oral)   Resp 18   Ht 5' 10\" (1.778 m)   Wt 185 lb 12.8 oz (84.3 kg)   SpO2 99%   BMI 26.66 kg/m²      Eyes: EOM are normal. Right eye exhibits no discharge. Left eye exhibits no discharge. No scleral icterus. Neck: Neck supple. Cardiovascular: Normal rate, regular rhythm and normal heart sounds. Exam reveals no gallop and no friction rub. No murmur heard. Pulmonary/Chest: Effort normal and breath sounds normal. No respiratory distress. He has no wheezes. He has no rales. Musculoskeletal: He exhibits no edema or tenderness. Lymphadenopathy:     He has no cervical adenopathy. Neurological: He is alert. He exhibits normal muscle tone. Skin: Skin is warm and dry. Psychiatric: He has a normal mood and affect. MMSE 29/30, one point missed on recall.       Lab Results   Component Value Date/Time    Sodium 140 08/06/2018 10:08 AM    Potassium 4.2 08/06/2018 10:08 AM    Chloride 105 08/06/2018 10:08 AM    CO2 29 08/06/2018 10:08 AM    Anion gap 6 08/06/2018 10:08 AM    Glucose 120 (H) 08/06/2018 10:08 AM    BUN 12 08/06/2018 10:08 AM    Creatinine 1.06 08/06/2018 10:08 AM BUN/Creatinine ratio 11 (L) 08/06/2018 10:08 AM    GFR est AA >60 08/06/2018 10:08 AM    GFR est non-AA >60 08/06/2018 10:08 AM    Calcium 8.9 08/06/2018 10:08 AM    Bilirubin, total 0.4 08/06/2018 10:08 AM    AST (SGOT) 18 08/06/2018 10:08 AM    Alk. phosphatase 73 08/06/2018 10:08 AM    Protein, total 7.9 08/06/2018 10:08 AM    Albumin 4.2 08/06/2018 10:08 AM    Globulin 3.7 08/06/2018 10:08 AM    A-G Ratio 1.1 08/06/2018 10:08 AM    ALT (SGPT) 22 08/06/2018 10:08 AM     Lab Results   Component Value Date/Time    WBC 4.2 (L) 08/06/2018 10:08 AM    Hemoglobin, POC 6.8 (L) 11/18/2010 11:05 AM    HGB 14.3 08/06/2018 10:08 AM    Hematocrit, POC 20 (L) 11/18/2010 11:05 AM    HCT 42.8 08/06/2018 10:08 AM    PLATELET 184 89/54/4577 10:08 AM    MCV 88.1 08/06/2018 10:08 AM     Lab Results   Component Value Date/Time    Prostate Specific Ag <0.1 08/06/2018 10:08 AM    Prostate Specific Ag <0.03 06/04/2013    Prostate Specific Ag <0.1 04/29/2011     ASSESSMENT and PLAN    ICD-10-CM ICD-9-CM    1. Essential hypertension I10 401.9    2. Subjective memory complaints R41.89 780.93 REFERRAL TO GERIATRICS     BP stable on manual check  MMSE 29/30 but wife concerned with decline in memory. Referral entered to [facility] for further evaluation.

## 2019-07-15 NOTE — PATIENT INSTRUCTIONS
Low Sodium Diet (2,000 Milligram): Care Instructions Your Care Instructions Too much sodium causes your body to hold on to extra water. This can raise your blood pressure and force your heart and kidneys to work harder. In very serious cases, this could cause you to be put in the hospital. It might even be life-threatening. By limiting sodium, you will feel better and lower your risk of serious problems. The most common source of sodium is salt. People get most of the salt in their diet from canned, prepared, and packaged foods. Fast food and restaurant meals also are very high in sodium. Your doctor will probably limit your sodium to less than 2,000 milligrams (mg) a day. This limit counts all the sodium in prepared and packaged foods and any salt you add to your food. Follow-up care is a key part of your treatment and safety. Be sure to make and go to all appointments, and call your doctor if you are having problems. It's also a good idea to know your test results and keep a list of the medicines you take. How can you care for yourself at home? Read food labels · Read labels on cans and food packages. The labels tell you how much sodium is in each serving. Make sure that you look at the serving size. If you eat more than the serving size, you have eaten more sodium. · Food labels also tell you the Percent Daily Value for sodium. Choose products with low Percent Daily Values for sodium. · Be aware that sodium can come in forms other than salt, including monosodium glutamate (MSG), sodium citrate, and sodium bicarbonate (baking soda). MSG is often added to Asian food. When you eat out, you can sometimes ask for food without MSG or added salt. Buy low-sodium foods · Buy foods that are labeled \"unsalted\" (no salt added), \"sodium-free\" (less than 5 mg of sodium per serving), or \"low-sodium\" (less than 140 mg of sodium per serving).  Foods labeled \"reduced-sodium\" and \"light sodium\" may still have too much sodium. Be sure to read the label to see how much sodium you are getting. · Buy fresh vegetables, or frozen vegetables without added sauces. Buy low-sodium versions of canned vegetables, soups, and other canned goods. Prepare low-sodium meals · Cut back on the amount of salt you use in cooking. This will help you adjust to the taste. Do not add salt after cooking. One teaspoon of salt has about 2,300 mg of sodium. · Take the salt shaker off the table. · Flavor your food with garlic, lemon juice, onion, vinegar, herbs, and spices. Do not use soy sauce, lite soy sauce, steak sauce, onion salt, garlic salt, celery salt, mustard, or ketchup on your food. · Use low-sodium salad dressings, sauces, and ketchup. Or make your own salad dressings and sauces without adding salt. · Use less salt (or none) when recipes call for it. You can often use half the salt a recipe calls for without losing flavor. Other foods such as rice, pasta, and grains do not need added salt. · Rinse canned vegetables, and cook them in fresh water. This removes somebut not allof the salt. · Avoid water that is naturally high in sodium or that has been treated with water softeners, which add sodium. Call your local water company to find out the sodium content of your water supply. If you buy bottled water, read the label and choose a sodium-free brand. Avoid high-sodium foods · Avoid eating: 
? Smoked, cured, salted, and canned meat, fish, and poultry. ? Ham, vizcaino, hot dogs, and luncheon meats. ? Regular, hard, and processed cheese and regular peanut butter. ? Crackers with salted tops, and other salted snack foods such as pretzels, chips, and salted popcorn. ? Frozen prepared meals, unless labeled low-sodium. ? Canned and dried soups, broths, and bouillon, unless labeled sodium-free or low-sodium. ? Canned vegetables, unless labeled sodium-free or low-sodium. ? [place] fries, pizza, tacos, and other fast foods. ? Pickles, olives, ketchup, and other condiments, especially soy sauce, unless labeled sodium-free or low-sodium. Where can you learn more? Go to http://lore-georgie.info/. Enter K735 in the search box to learn more about \"Low Sodium Diet (2,000 Milligram): Care Instructions. \" Current as of: March 28, 2018 Content Version: 11.9 © 5490-2639 [employer]. Care instructions adapted under license by [employer] (which disclaims liability or warranty for this information). If you have questions about a medical condition or this instruction, always ask your healthcare professional. [address] any warranty or liability for your use of this information.

## 2019-07-15 NOTE — PROGRESS NOTES
Rm: 16    Chief Complaint   Patient presents with    Hypertension     Depression Screening:  3 most recent PHQ Screens 7/15/2019 12/24/2018 11/8/2018 10/30/2018   Little interest or pleasure in doing things Not at all Not at all Not at all Not at all   Feeling down, depressed, irritable, or hopeless Not at all Not at all Not at all Not at all   Total Score PHQ 2 0 0 0 0       Learning Assessment:  Learning Assessment 8/24/2017 8/25/2016   PRIMARY LEARNER Patient Patient   PRIMARY LANGUAGE ENGLISH ENGLISH   LEARNER PREFERENCE PRIMARY DEMONSTRATION DEMONSTRATION   ANSWERED BY Patient Patient   RELATIONSHIP SELF SELF       Abuse Screening:  Abuse Screening Questionnaire 10/30/2018   Do you ever feel afraid of your partner? N   Are you in a relationship with someone who physically or mentally threatens you? N   Is it safe for you to go home? Y       Health Maintenance reviewed and discussed per provider: yes     Coordination of Care:    1. Have you been to the ER, urgent care clinic since your last visit? Hospitalized since your last visit? no    2. Have you seen or consulted any other health care providers outside of the [address] [place] [address] since your last visit? Include any pap smears or colon screening.  no

## 2019-08-08 ENCOUNTER — OFFICE VISIT (OUTPATIENT)
Dept: CARDIOLOGY CLINIC | Age: 74
End: 2019-08-08

## 2019-08-08 ENCOUNTER — CLINICAL SUPPORT (OUTPATIENT)
Dept: CARDIOLOGY CLINIC | Age: 74
End: 2019-08-08

## 2019-08-08 VITALS
BODY MASS INDEX: 26.2 KG/M2 | HEART RATE: 71 BPM | HEIGHT: 70 IN | DIASTOLIC BLOOD PRESSURE: 80 MMHG | SYSTOLIC BLOOD PRESSURE: 120 MMHG | OXYGEN SATURATION: 97 % | WEIGHT: 183 LBS

## 2019-08-08 DIAGNOSIS — Z95.9 CARDIAC DEVICE IN SITU: ICD-10-CM

## 2019-08-08 DIAGNOSIS — I47.1 SVT (SUPRAVENTRICULAR TACHYCARDIA) (HCC): Primary | ICD-10-CM

## 2019-08-08 DIAGNOSIS — R42 DIZZY SPELLS: ICD-10-CM

## 2019-08-08 DIAGNOSIS — Z98.890 S/P ABLATION OPERATION FOR ARRHYTHMIA: ICD-10-CM

## 2019-08-08 DIAGNOSIS — I47.1 SVT (SUPRAVENTRICULAR TACHYCARDIA) (HCC): ICD-10-CM

## 2019-08-08 DIAGNOSIS — Z95.9 CARDIAC DEVICE IN SITU: Primary | ICD-10-CM

## 2019-08-08 DIAGNOSIS — I10 ESSENTIAL HYPERTENSION: ICD-10-CM

## 2019-08-08 DIAGNOSIS — Z86.79 S/P ABLATION OPERATION FOR ARRHYTHMIA: ICD-10-CM

## 2019-08-08 NOTE — PROGRESS NOTES
HPI: A 71-year old male here with complaints of dizziness. He was recently traveling from [place] to [place] after spending a night at the airport. During his plane flight he had episodes of diaphoresis and not feeling well. He had a faint pulse. His wife checked him as she is a retired nurse. The rest of the trip was uneventful. He could not perform a transmission from home. He is otherwise doing well without any new chest pain, dyspnea, PND, orthopnea, edema. Impression/Plan:  1. Subcutaneous loop recorder placed April 2016 without any significant events. 2. EP study September 2017 without any sustained arrhythmias except for a short run of possible atrial tachycardia not more than one minute. 3. History of bradycardia with heart rate down to the 40s but nothing significant on loop recorder to warrant pacemaker. 4. History of PVCs, ablation a number of years ago by Dr. Sandhya Rice. 5. Remote cardiomyopathy improving to normal 2017. 6. History of gastric outflow obstruction, remote pancreatitis, resolved. 7. Hypertension, controlled. His device does not show any events. I provided reassurance. We will continue to monitor until December when I see him back and depending upon his battery status, we may talk about explant. But, at this point, there is no indication for ablation, EP study or pacemaker. Past Medical History:   Diagnosis Date    Anemia     [facility]     Bradycardia     Chronic cholecystitis     [facility]deep venous thrombosis) ([facility])     Echocardiogram 05/21/2015    EF 55-60%. No RWMA. Gr 1 DDfx. Septal knob. RVSP 25-30 mmHg. Mild MR. Mild TR. Mild PI.      ED (erectile dysfunction)     Esophageal reflux     Essential hypertension     GERD (gastroesophageal reflux disease)     Hypercholesteremia     Leukocytosis     Nuclear cardiac imaging test 10/29/2010    Slightly mottled perfusion w/o focal reversible defect. EF 42%. Global hypk.   May represent a nonspecific cardiomyopathy.  Pancreatitis     Pancreatitis     Prostate CA ([facility] [address]     Stage [id number] Lanagan sum 3+3, s/p DVP 5/24/07      Ventral hernia        Current Outpatient Medications   Medication Sig Dispense Refill    lansoprazole (PREVACID) 30 mg capsule Take  by mouth Daily (before breakfast).  Cholecalciferol, Vitamin D3, (VITAMIN D3) 1,000 unit cap Take  by mouth.  therapeutic multivitamin (THERAGRAN) tablet Take 1 tablet by mouth daily.  polyethylene glycol (MIRALAX) 17 gram packet Take 17 g by mouth as needed.  DOCUSATE SODIUM (COLACE PO) Take  by mouth. Social History   reports that he quit smoking about 35 years ago. He has a 19.00 pack-year smoking history. He has never used smokeless tobacco.   reports that he drinks alcohol. Family History  family history includes Cancer in his mother; Heart Disease in his father; Hypertension in his maternal grandmother and mother. Review of Systems  Except as stated above include:  Constitutional: Negative for fever, chills and malaise/fatigue. HEENT: No congestion or recent URI. Gastrointestinal: No nausea, vomiting, abdominal pain, bloody stools. Pulmonary:  Negative except as stated above. Cardiac:  Negative except as stated above. Musculoskeletal: Negative except as stated above. Neurological:  No localized symptoms. Skin:  Negative except as stated above. Psych:  Negative except as stated above. Endocrine:  Negative except as stated above. PHYSICAL EXAM  BP Readings from Last 3 Encounters:   07/15/19 142/78   12/27/18 116/70   12/24/18 121/78     Pulse Readings from Last 3 Encounters:   07/15/19 70   12/27/18 90   12/24/18 87     Wt Readings from Last 3 Encounters:   08/08/19 83 kg (183 lb)   07/15/19 84.3 kg (185 lb 12.8 oz)   12/27/18 81.2 kg (179 lb)     General:   Well developed, well groomed. Head/Neck:   No jugular venous distention     No carotid bruits.     No evidence of xanthelasma. Lungs:   No respiratory distress. Clear bilaterally. Heart:    Regular rate and rhythm. Normal S1/S2. Palpation of heart with normal point of maximum impulse. No significant murmurs, rubs or gallops. ILR in place. Abdomen:   Soft and nontender. No palpable abdominal mass or bruits. Extremities:   Intact peripheral pulses. No significant edema. Neurological:   Alert and oriented to person, place, time. No focal neurological deficit visually. Skin:   No obvious rash    Blood Pressure Metric:  Monitor recommended and adjustments stated if needed.

## 2019-08-09 NOTE — PROGRESS NOTES
I have personally seen and evaluated the device findings. Interrogation reviewed and I agree with assessment.     Selene Bowser

## 2019-10-01 ENCOUNTER — OFFICE VISIT (OUTPATIENT)
Dept: INTERNAL MEDICINE CLINIC | Age: 74
End: 2019-10-01

## 2020-01-16 ENCOUNTER — OFFICE VISIT (OUTPATIENT)
Dept: CARDIOLOGY CLINIC | Age: 75
End: 2020-01-16

## 2020-01-16 ENCOUNTER — CLINICAL SUPPORT (OUTPATIENT)
Dept: CARDIOLOGY CLINIC | Age: 75
End: 2020-01-16

## 2020-01-16 VITALS
BODY MASS INDEX: 26.63 KG/M2 | OXYGEN SATURATION: 97 % | HEIGHT: 70 IN | HEART RATE: 72 BPM | WEIGHT: 186 LBS | SYSTOLIC BLOOD PRESSURE: 132 MMHG | DIASTOLIC BLOOD PRESSURE: 96 MMHG

## 2020-01-16 DIAGNOSIS — Z98.890 S/P ABLATION OPERATION FOR ARRHYTHMIA: ICD-10-CM

## 2020-01-16 DIAGNOSIS — I47.1 SVT (SUPRAVENTRICULAR TACHYCARDIA) (HCC): Primary | ICD-10-CM

## 2020-01-16 DIAGNOSIS — R42 DIZZY SPELLS: ICD-10-CM

## 2020-01-16 DIAGNOSIS — Z95.9 CARDIAC DEVICE IN SITU: ICD-10-CM

## 2020-01-16 DIAGNOSIS — I10 ESSENTIAL HYPERTENSION: ICD-10-CM

## 2020-01-16 DIAGNOSIS — Z95.9 CARDIAC DEVICE IN SITU: Primary | ICD-10-CM

## 2020-01-16 DIAGNOSIS — I42.9 CARDIOMYOPATHY, UNSPECIFIED TYPE (HCC): ICD-10-CM

## 2020-01-16 DIAGNOSIS — I47.1 SVT (SUPRAVENTRICULAR TACHYCARDIA) (HCC): ICD-10-CM

## 2020-01-16 DIAGNOSIS — Z86.79 S/P ABLATION OPERATION FOR ARRHYTHMIA: ICD-10-CM

## 2020-01-16 RX ORDER — LOSARTAN POTASSIUM 25 MG/1
25 TABLET ORAL DAILY
Qty: 30 TAB | Refills: 6 | Status: SHIPPED | OUTPATIENT
Start: 2020-01-16 | End: 2021-08-05

## 2020-01-16 NOTE — PATIENT INSTRUCTIONS
DR. COU[facility]HLIN[facility] Patient  EP Instructions 1. You are scheduled to have a Loop explant on  January 29, 2020 , at 8:00 am.    Please check in at 7:00 am. 
 
2. Please go to [facility] and park in the outpatient parking lot that is located around to the back of the hospital and enter through the [employer]. Once you enter through the [facility] check in with the  there. The  will either give you directions or assist you in getting to the cath holding area. 3.  You are not to eat or drink anything after midnight the night before your  procedure. 4. Please continue to take your medications with a small sip of water on the morning of the procedure with the following exceptions:   
 
5. If you are diabetic, do not take your insulin/sugar pill the morning of the procedure. 6. We encourage families to wait in the waiting room on the first floor while the procedure is being done. The Doctor will come out and talk with you as soon as the procedure is over. 7. There is the possibility that you may spend the night in the hospital, depending on the results of the procedure. This will be determined after the procedure is done. 8.   If you or your family have any questions, please call our office Monday-Friday 9:00am  
      -4:30 pm , at [phone], and ask to speak to one of the nurses.

## 2020-01-16 NOTE — PROGRESS NOTES
HPI: A 71-year old male here for follow up. Overall, he is doing well. He denies any new chest pain, dyspnea, PND, orthopnea or edema. He established care with Dr. Ulysses Butcher yesterday. His systolic blood pressure was about 160 mmHg. The plan was to monitor and start an agent if necessary. Today his systolic blood pressure is also 160 mmHg. He has not paid attention to his salt intake. Impression/Plan:  1. Hypertension, increased systolic 242 mmHg yesterday and today, but not yet monitoring salt. 2. Subcutaneous loop recorder placed April 2016 without any significant events. 3. EP study September 2017 without any sustained arrhythmias except for a short run of possible atrial tachycardia not more than one minute. 4. History of bradycardia with heart rate down to the 40s but nothing significant on loop recorder to warrant a pacemaker. 5. History of PVCs with remote ablation by Dr. Nevin Skaggs. 6. Remote cardiomyopathy improving to normal 2017.  7. History of gastric outflow obstruction with remote pancreatitis, resolved. At this point his blood pressure is high both yesterday and today. I have recommended we start losartan 25 mg daily and he is already scheduled to get blood work within the next couple of weeks. I told him to check his blood pressure in the next week and again if it remains > 140 mmHg to go ahead and start the losartan. I am going to schedule him for a subcutaneous loop recorder explant since it is now at end of service and he is not showing any events. All questions answered. Past Medical History:   Diagnosis Date    Anemia     [facility]     Bradycardia     Chronic cholecystitis     [facility]     Echocardiogram 05/21/2015    EF 55-60%. No RWMA. Gr 1 DDfx. Septal knob. RVSP 25-30 mmHg. Mild MR. Mild TR.   Mild PI.      ED (erectile dysfunction)     Esophageal reflux     Essential hypertension     GERD (gastroesophageal reflux disease)     Hypercholesteremia     Leukocytosis     Nuclear cardiac imaging test 10/29/2010    Slightly mottled perfusion w/o focal reversible defect. EF 42%. Global hypk. May represent a nonspecific cardiomyopathy.  Pancreatitis     Pancreatitis     Prostate CA ([facility] [address]     Stage [id number] Junaid sum 3+3, s/p DVP 5/24/07      Ventral hernia        Current Outpatient Medications   Medication Sig Dispense Refill    lansoprazole (PREVACID) 30 mg capsule Take  by mouth Daily (before breakfast).  Cholecalciferol, Vitamin D3, (VITAMIN D3) 1,000 unit cap Take  by mouth.  therapeutic multivitamin (THERAGRAN) tablet Take 1 tablet by mouth daily.  polyethylene glycol (MIRALAX) 17 gram packet Take 17 g by mouth as needed.  DOCUSATE SODIUM (COLACE PO) Take  by mouth. Social History   reports that he quit smoking about 35 years ago. He has a 19.00 pack-year smoking history. He has never used smokeless tobacco.   reports current alcohol use. Family History  family history includes Cancer in his mother; Heart Disease in his father; Hypertension in his maternal grandmother and mother. Review of Systems  Except as stated above include:  Constitutional: Negative for fever, chills and malaise/fatigue. HEENT: No congestion or recent URI. Gastrointestinal: No nausea, vomiting, abdominal pain, bloody stools. Pulmonary:  Negative except as stated above. Cardiac:  Negative except as stated above. Musculoskeletal: Negative except as stated above. Neurological:  No localized symptoms. Skin:  Negative except as stated above. Psych:  Negative except as stated above. Endocrine:  Negative except as stated above.     PHYSICAL EXAM  BP Readings from Last 3 Encounters:   01/16/20 (!) 132/96   08/08/19 120/80   07/15/19 142/78     Pulse Readings from Last 3 Encounters:   01/16/20 72   08/08/19 71   07/15/19 70     Wt Readings from Last 3 Encounters:   01/16/20 84.4 kg (186 lb)   08/08/19 83 kg (183 lb)   07/15/19 84.3 kg (185 lb 12.8 oz)     General:   Well developed, well groomed. Head/Neck:   No jugular venous distention     No carotid bruits. No evidence of xanthelasma. Lungs:   No respiratory distress. Clear bilaterally. Heart:    Regular rate and rhythm. Normal S1/S2. Palpation of heart with normal point of maximum impulse. No significant murmurs, rubs or gallops. Abdomen:   Soft and nontender. No palpable abdominal mass or bruits. Extremities:   Intact peripheral pulses. No significant edema. Neurological:   Alert and oriented to person, place, time. No focal neurological deficit visually. Skin:   No obvious rash    Blood Pressure Metric:  Monitor recommended and adjustments stated if needed.

## 2020-01-16 NOTE — PROGRESS NOTES
Tommyvicente Cochran. presents today for   Chief Complaint   Patient presents with    SVT     follow up        Tommy Cochran. preferred language for health care discussion is english/other. Is someone accompanying this pt? Wife     Is the patient using any DME equipment during [address]? no    Depression Screening:  3 most recent PHQ Screens 8/8/2019   Little interest or pleasure in doing things Not at all   Feeling down, depressed, irritable, or hopeless Not at all   Total Score PHQ 2 0       Learning Assessment:  Learning Assessment 8/24/2017   PRIMARY LEARNER Patient   PRIMARY LANGUAGE ENGLISH   LEARNER PREFERENCE PRIMARY DEMONSTRATION   ANSWERED BY Patient   RELATIONSHIP SELF       Abuse Screening:  Abuse Screening Questionnaire 10/30/2018   Do you ever feel afraid of your partner? N   Are you in a relationship with someone who physically or mentally threatens you? N   Is it safe for you to go home? Y       Fall Risk  Fall Risk Assessment, last 12 mths 7/15/2019   Able to walk? Yes   Fall in past 12 months? No       Pt currently taking Anticoagulant therapy? no    Coordination of Care:  1. Have you been to the ER, urgent care clinic since your last visit? Hospitalized since your last visit? no    2. Have you seen or consulted any other health care providers outside of the [address] [place] [address] since your last visit? Include any pap smears or colon screening.  no

## 2020-01-22 ENCOUNTER — HOSPITAL ENCOUNTER (OUTPATIENT)
Dept: GENERAL RADIOLOGY | Age: 75
Discharge: HOME | End: 2020-01-22
Payer: MEDICARE

## 2020-01-22 ENCOUNTER — HOSPITAL ENCOUNTER (OUTPATIENT)
Dept: LAB | Age: 75
Discharge: HOME | End: 2020-01-22
Payer: MEDICARE

## 2020-01-22 ENCOUNTER — HOSPITAL ENCOUNTER (OUTPATIENT)
Dept: PREADMISSION TESTING | Age: 75
Discharge: HOME | End: 2020-01-22
Payer: MEDICARE

## 2020-01-22 DIAGNOSIS — I10 ESSENTIAL HYPERTENSION: ICD-10-CM

## 2020-01-22 DIAGNOSIS — I47.1 SVT (SUPRAVENTRICULAR TACHYCARDIA) (HCC): ICD-10-CM

## 2020-01-22 DIAGNOSIS — Z98.890 S/P ABLATION OPERATION FOR ARRHYTHMIA: ICD-10-CM

## 2020-01-22 DIAGNOSIS — Z95.9 CARDIAC DEVICE IN SITU: ICD-10-CM

## 2020-01-22 DIAGNOSIS — I49.5 SINOATRIAL NODE DYSFUNCTION (HCC): ICD-10-CM

## 2020-01-22 DIAGNOSIS — E78.5 HYPERLIPEMIA: ICD-10-CM

## 2020-01-22 DIAGNOSIS — I10 ESSENTIAL HYPERTENSION, MALIGNANT: ICD-10-CM

## 2020-01-22 DIAGNOSIS — J30.2 SEASONAL ALLERGIC RHINITIS: ICD-10-CM

## 2020-01-22 DIAGNOSIS — C61 MALIGNANT NEOPLASM OF PROSTATE (HCC): ICD-10-CM

## 2020-01-22 DIAGNOSIS — K21.00 REFLUX ESOPHAGITIS: ICD-10-CM

## 2020-01-22 DIAGNOSIS — C44.90 SKIN CANCER: ICD-10-CM

## 2020-01-22 DIAGNOSIS — Z86.79 S/P ABLATION OPERATION FOR ARRHYTHMIA: ICD-10-CM

## 2020-01-22 DIAGNOSIS — D69.3 IMMUNE THROMBOCYTOPENIC PURPURA (HCC): ICD-10-CM

## 2020-01-22 DIAGNOSIS — Z95.828 ACQUIRED PORTAL-SYSTEMIC SHUNT: ICD-10-CM

## 2020-01-22 DIAGNOSIS — K85.90 ACUTE PANCREATITIS: ICD-10-CM

## 2020-01-22 LAB
ALBUMIN SERPL-MCNC: 4.1 G/DL (ref 3.4–5)
ALBUMIN SERPL-MCNC: 4.2 G/DL (ref 3.4–5)
ALBUMIN/GLOB SERPL: 1.2 {RATIO} (ref 0.8–1.7)
ALBUMIN/GLOB SERPL: 1.3 {RATIO} (ref 0.8–1.7)
ALP SERPL-CCNC: 64 U/L (ref 45–117)
ALP SERPL-CCNC: 65 U/L (ref 45–117)
ALT SERPL-CCNC: 19 U/L (ref 16–61)
ALT SERPL-CCNC: 20 U/L (ref 16–61)
ANION GAP SERPL CALC-SCNC: 5 MMOL/L (ref 3–18)
ANION GAP SERPL CALC-SCNC: 6 MMOL/L (ref 3–18)
APPEARANCE UR: CLEAR
AST SERPL-CCNC: 16 U/L (ref 10–38)
AST SERPL-CCNC: 16 U/L (ref 10–38)
BASOPHILS # BLD: 0 K/UL (ref 0–0.1)
BASOPHILS # BLD: 0.1 K/UL (ref 0–0.1)
BASOPHILS NFR BLD: 1 % (ref 0–2)
BASOPHILS NFR BLD: 1 % (ref 0–2)
BILIRUB SERPL-MCNC: 0.5 MG/DL (ref 0.2–1)
BILIRUB SERPL-MCNC: 0.6 MG/DL (ref 0.2–1)
BILIRUB UR QL: NEGATIVE
BUN SERPL-MCNC: 9 MG/DL (ref 7–18)
BUN SERPL-MCNC: 9 MG/DL (ref 7–18)
BUN/CREAT SERPL: 10 (ref 12–20)
BUN/CREAT SERPL: 10 (ref 12–20)
CALCIUM SERPL-MCNC: 9 MG/DL (ref 8.5–10.1)
CALCIUM SERPL-MCNC: 9.5 MG/DL (ref 8.5–10.1)
CHLORIDE SERPL-SCNC: 106 MMOL/L (ref 100–111)
CHLORIDE SERPL-SCNC: 106 MMOL/L (ref 100–111)
CO2 SERPL-SCNC: 30 MMOL/L (ref 21–32)
CO2 SERPL-SCNC: 30 MMOL/L (ref 21–32)
COLOR UR: YELLOW
CREAT SERPL-MCNC: 0.86 MG/DL (ref 0.6–1.3)
CREAT SERPL-MCNC: 0.89 MG/DL (ref 0.6–1.3)
DIFFERENTIAL METHOD BLD: ABNORMAL
DIFFERENTIAL METHOD BLD: ABNORMAL
EOSINOPHIL # BLD: 0.1 K/UL (ref 0–0.4)
EOSINOPHIL # BLD: 0.1 K/UL (ref 0–0.4)
EOSINOPHIL NFR BLD: 3 % (ref 0–5)
EOSINOPHIL NFR BLD: 3 % (ref 0–5)
ERYTHROCYTE [DISTWIDTH] IN BLOOD BY AUTOMATED COUNT: 13.3 % (ref 11.6–14.5)
ERYTHROCYTE [DISTWIDTH] IN BLOOD BY AUTOMATED COUNT: 13.4 % (ref 11.6–14.5)
GLOBULIN SER CALC-MCNC: 3.3 G/DL (ref 2–4)
GLOBULIN SER CALC-MCNC: 3.4 G/DL (ref 2–4)
GLUCOSE SERPL-MCNC: 114 MG/DL (ref 74–99)
GLUCOSE SERPL-MCNC: 115 MG/DL (ref 74–99)
GLUCOSE UR STRIP.AUTO-MCNC: NEGATIVE MG/DL
HBA1C MFR BLD: 6.2 % (ref 4.2–5.6)
HCT VFR BLD AUTO: 42.6 % (ref 36–48)
HCT VFR BLD AUTO: 43.2 % (ref 36–48)
HGB BLD-MCNC: 13.9 G/DL (ref 13–16)
HGB BLD-MCNC: 14 G/DL (ref 13–16)
HGB UR QL STRIP: NEGATIVE
INR PPP: 1.1 (ref 0.8–1.2)
KETONES UR QL STRIP.AUTO: NEGATIVE MG/DL
LEUKOCYTE ESTERASE UR QL STRIP.AUTO: NEGATIVE
LYMPHOCYTES # BLD: 1.1 K/UL (ref 0.9–3.6)
LYMPHOCYTES # BLD: 1.2 K/UL (ref 0.9–3.6)
LYMPHOCYTES NFR BLD: 29 % (ref 21–52)
LYMPHOCYTES NFR BLD: 29 % (ref 21–52)
MCH RBC QN AUTO: 29 PG (ref 24–34)
MCH RBC QN AUTO: 29.6 PG (ref 24–34)
MCHC RBC AUTO-ENTMCNC: 32.2 G/DL (ref 31–37)
MCHC RBC AUTO-ENTMCNC: 32.9 G/DL (ref 31–37)
MCV RBC AUTO: 90.1 FL (ref 74–97)
MCV RBC AUTO: 90.2 FL (ref 74–97)
MONOCYTES # BLD: 0.4 K/UL (ref 0.05–1.2)
MONOCYTES # BLD: 0.4 K/UL (ref 0.05–1.2)
MONOCYTES NFR BLD: 11 % (ref 3–10)
MONOCYTES NFR BLD: 9 % (ref 3–10)
NEUTS SEG # BLD: 2.2 K/UL (ref 1.8–8)
NEUTS SEG # BLD: 2.3 K/UL (ref 1.8–8)
NEUTS SEG NFR BLD: 56 % (ref 40–73)
NEUTS SEG NFR BLD: 58 % (ref 40–73)
NITRITE UR QL STRIP.AUTO: NEGATIVE
PH UR STRIP: 7 [PH] (ref 5–8)
PLATELET # BLD AUTO: 131 K/UL (ref 135–420)
PLATELET # BLD AUTO: 137 K/UL (ref 135–420)
PMV BLD AUTO: 10.7 FL (ref 9.2–11.8)
PMV BLD AUTO: 11.1 FL (ref 9.2–11.8)
POTASSIUM SERPL-SCNC: 4.1 MMOL/L (ref 3.5–5.5)
POTASSIUM SERPL-SCNC: 4.1 MMOL/L (ref 3.5–5.5)
PROT SERPL-MCNC: 7.5 G/DL (ref 6.4–8.2)
PROT SERPL-MCNC: 7.5 G/DL (ref 6.4–8.2)
PROT UR STRIP-MCNC: NEGATIVE MG/DL
PROTHROMBIN TIME: 14.2 SEC (ref 11.5–15.2)
PSA SERPL-MCNC: 0 NG/ML (ref 0–4)
RBC # BLD AUTO: 4.73 M/UL (ref 4.7–5.5)
RBC # BLD AUTO: 4.79 M/UL (ref 4.7–5.5)
SODIUM SERPL-SCNC: 141 MMOL/L (ref 136–145)
SODIUM SERPL-SCNC: 142 MMOL/L (ref 136–145)
SP GR UR REFRACTOMETRY: 1.02 (ref 1–1.03)
UROBILINOGEN UR QL STRIP.AUTO: 0.2 EU/DL (ref 0.2–1)
WBC # BLD AUTO: 3.9 K/UL (ref 4.6–13.2)
WBC # BLD AUTO: 4 K/UL (ref 4.6–13.2)

## 2020-01-22 PROCEDURE — 84153 ASSAY OF PSA TOTAL: CPT

## 2020-01-22 PROCEDURE — 36415 COLL VENOUS BLD VENIPUNCTURE: CPT

## 2020-01-22 PROCEDURE — 81003 URINALYSIS AUTO W/O SCOPE: CPT

## 2020-01-22 PROCEDURE — 87086 URINE CULTURE/COLONY COUNT: CPT

## 2020-01-22 PROCEDURE — 85025 COMPLETE CBC W/AUTO DIFF WBC: CPT

## 2020-01-22 PROCEDURE — 85610 PROTHROMBIN TIME: CPT

## 2020-01-22 PROCEDURE — 83036 HEMOGLOBIN GLYCOSYLATED A1C: CPT

## 2020-01-22 PROCEDURE — 71046 X-RAY EXAM CHEST 2 VIEWS: CPT

## 2020-01-22 PROCEDURE — 80053 COMPREHEN METABOLIC PANEL: CPT

## 2020-01-23 LAB
BACTERIA SPEC CULT: NORMAL
SERVICE CMNT-IMP: NORMAL

## 2020-01-28 NOTE — H&P
Plan subcutaneous loop recorder explant. HPI: A 71-year old male here for follow up. Overall, he is doing well. He denies any new chest pain, dyspnea, PND, orthopnea or edema. He established care with Dr. Ruth Liu yesterday. His systolic blood pressure was about 160 mmHg. The plan was to monitor and start an agent if necessary. Today his systolic blood pressure is also 160 mmHg. He has not paid attention to his salt intake.     Impression/Plan:  1. Hypertension, increased systolic 442 mmHg yesterday and today, but not yet monitoring salt. 2. Subcutaneous loop recorder placed April 2016 without any significant events. 3. EP study September 2017 without any sustained arrhythmias except for a short run of possible atrial tachycardia not more than one minute. 4. History of bradycardia with heart rate down to the 40s but nothing significant on loop recorder to warrant a pacemaker. 5. History of PVCs with remote ablation by Dr. Connie Roth. 6. Remote cardiomyopathy improving to normal 2017.  7. History of gastric outflow obstruction with remote pancreatitis, resolved.      At this point his blood pressure is high both yesterday and today. I have recommended we start losartan 25 mg daily and he is already scheduled to get blood work within the next couple of weeks. I told him to check his blood pressure in the next week and again if it remains > 140 mmHg to go ahead and start the losartan. I am going to schedule him for a subcutaneous loop recorder explant since it is now at end of service and he is not showing any events. All questions answered.                 Past Medical History:   Diagnosis Date    Anemia      ARF [facility]acute renal failure) ([facility])      Bradycardia      Chronic cholecystitis      DVT (deep venous thrombosis) ([facility])      Echocardiogram 05/21/2015     EF 55-60%. No RWMA. Gr 1 DDfx. Septal knob. RVSP 25-30 mmHg. Mild MR. Mild TR.   Mild PI.      ED (erectile dysfunction)      Esophageal reflux    Essential hypertension      GERD (gastroesophageal reflux disease)      Hypercholesteremia      Leukocytosis      Nuclear cardiac imaging test 10/29/2010     Slightly mottled perfusion w/o focal reversible defect. EF 42%. Global hypk. May represent a nonspecific cardiomyopathy.  Pancreatitis      Pancreatitis      Prostate CA [facility])       Stage [id number] [place] sum 3+3, s/p DVP 5/24/07      Ventral hernia                  Current Outpatient Medications   Medication Sig Dispense Refill    lansoprazole (PREVACID) 30 mg capsule Take  by mouth Daily (before breakfast).        Cholecalciferol, Vitamin D3, (VITAMIN D3) 1,000 unit cap Take  by mouth.        therapeutic multivitamin (THERAGRAN) tablet Take 1 tablet by mouth daily.        polyethylene glycol (MIRALAX) 17 gram packet Take 17 g by mouth as needed.        DOCUSATE SODIUM (COLACE PO) Take  by mouth.               Social History   reports that he quit smoking about 35 years ago. He has a 19.00 pack-year smoking history. He has never used smokeless tobacco.   reports current alcohol use.     Family History  family history includes Cancer in his mother; Heart Disease in his father; Hypertension in his maternal grandmother and mother.     Review of Systems  Except as stated above include:  Constitutional: Negative for fever, chills and malaise/fatigue. HEENT: No congestion or recent URI. Gastrointestinal: No nausea, vomiting, abdominal pain, bloody stools. Pulmonary:  Negative except as stated above. Cardiac:  Negative except as stated above. Musculoskeletal: Negative except as stated above. Neurological:  No localized symptoms. Skin:  Negative except as stated above. Psych:  Negative except as stated above.   Endocrine:  Negative except as stated above.     PHYSICAL EXAM      BP Readings from Last 3 Encounters:   01/16/20 (!) 132/96   08/08/19 120/80   07/15/19 142/78          Pulse Readings from Last 3 Encounters:   01/16/20 72 08/08/19 71   07/15/19 70          Wt Readings from Last 3 Encounters:   01/16/20 84.4 kg (186 lb)   08/08/19 83 kg (183 lb)   07/15/19 84.3 kg (185 lb 12.8 oz)      General:          Well developed, well groomed. Head/Neck:     No jugular venous distention                           No carotid bruits. No evidence of xanthelasma. Lungs:             No respiratory distress. Clear bilaterally. Heart:                          Regular rate and rhythm. Normal S1/S2. Palpation of heart with normal point of maximum impulse. No significant murmurs, rubs or gallops. Abdomen:        Soft and nontender. No palpable abdominal mass or bruits. Extremities:     Intact peripheral pulses. No significant edema. Neurological:   Alert and oriented to person, place, time. No focal neurological deficit visually.   Skin:                No obvious rash     Blood Pressure Metric:  Monitor recommended and adjustments stated if needed.         Electronically signed by Bouchra Bernal MD at 01/16/20 7057

## 2020-01-29 ENCOUNTER — HOSPITAL ENCOUNTER (OUTPATIENT)
Age: 75
Setting detail: OUTPATIENT SURGERY
Discharge: HOME | End: 2020-01-29
Attending: INTERNAL MEDICINE | Admitting: INTERNAL MEDICINE
Payer: MEDICARE

## 2020-01-29 VITALS
WEIGHT: 181 LBS | HEART RATE: 66 BPM | DIASTOLIC BLOOD PRESSURE: 85 MMHG | RESPIRATION RATE: 26 BRPM | OXYGEN SATURATION: 99 % | HEIGHT: 70 IN | BODY MASS INDEX: 25.91 KG/M2 | SYSTOLIC BLOOD PRESSURE: 131 MMHG

## 2020-01-29 DIAGNOSIS — R00.1 BRADYCARDIA: ICD-10-CM

## 2020-01-29 PROCEDURE — 74011000250 HC RX REV CODE- 250: Performed by: INTERNAL MEDICINE

## 2020-01-29 PROCEDURE — 33286 RMVL SUBQ CAR RHYTHM MNTR: CPT | Performed by: INTERNAL MEDICINE

## 2020-01-29 PROCEDURE — 77030002933 HC SUT MCRYL J&J -A: Performed by: INTERNAL MEDICINE

## 2020-01-29 PROCEDURE — 77030011255 HC DSG AQUACEL AG BMS -A: Performed by: INTERNAL MEDICINE

## 2020-01-29 RX ORDER — LIDOCAINE HYDROCHLORIDE AND EPINEPHRINE 10; 10 MG/ML; UG/ML
INJECTION, SOLUTION INFILTRATION; PERINEURAL AS NEEDED
Status: DISCONTINUED | OUTPATIENT
Start: 2020-01-29 | End: 2020-01-29 | Stop reason: HOSPADM

## 2020-01-29 NOTE — PROGRESS NOTES
TRANSFER - IN REPORT:    Verbal report received from [facility] (name) on [employer]. from Bed 19 for routine post - op      Report consisted of patients Situation, Background, Assessment and   Recommendations(SBAR). Information from the following report(s) SBAR, Procedure Summary and MAR was reviewed with the receiving nurse. Opportunity for questions and clarification was provided. Assessment completed upon patients arrival to unit and care assumed.

## 2020-01-29 NOTE — PERIOP NOTES
PAT - SURGICAL PRE-ADMISSION INSTRUCTIONS    NAME:  Radha Cortes DATE:  1/29/2020    SURGERY DATE:  1/31/2020                                  SURGERY ARRIVAL TIME:   0715    1. Do NOT eat or drink anything, including candy or gum, after MIDNIGHT on 01/30/2020 , unless you have specific instructions from your Surgeon or Anesthesia Provider to do so. 2. No smoking on the day of surgery. 3. No alcohol 24 hours prior to the day of surgery. 4. No recreational drugs for one week prior to the day of surgery. 5. Leave all valuables, including money/purse, at home. 6. Remove all jewelry, nail polish, makeup (including mascara); no lotions, powders, deodorant, or perfume/cologne/after shave. 7. Glasses/Contact lenses and Dentures may be worn to the hospital.  They will be removed prior to surgery. 8. Call your doctor if symptoms of a cold or illness develop within 24 ours prior to surgery. 9. AN ADULT MUST DRIVE YOU HOME AFTER OUTPATIENT SURGERY. 10. If you are having an OUTPATIENT procedure, please make arrangements for a responsible adult to be with you for 24 hours after your surgery. 11. If you are admitted to the hospital, you will be assigned to a bed after surgery is complete. Normally a family member will not be able to see you until you are in your assigned bed. 15. Family is encouraged to accompany you to the hospital.  We ask visitors in the treatment area to be limited to ONE person at a time to ensure patient privacy. EXCEPTIONS WILL BE MADE AS NEEDED. 15. Children under 12 are discouraged from entering the treatment area and need to be supervised by an adult when in the waiting room. Special Instructions:    Complete bowel prep per MD instructions. These surgical instructions were reviewed with Ac De Jesus during the PAT phone call. Directions:   On the morning of surgery, please go to the [facility]. Enter the building from the [facility] entrance, 1st floor (next to the Emergency Room entrance). Take the elevator to the 2nd floor. Sign in at the Registration Desk.     If you have any questions and/or concerns, please do not hesitate to call:  (Prior to the day of surgery)  [facility] unit:  [phone]  (Day of surgery)  [facility] unit:  [phone]

## 2020-01-29 NOTE — PROGRESS NOTES
Patient resting comfortably on the stretcher with family member at bedside. Patient does not appear to be in any distress.

## 2020-01-29 NOTE — PROGRESS NOTES
Cath holding summary     Patient escorted to cath holding from waiting area ambulatory, alert and oriented x 4, voicing no complaints. Changed into gown and placed on monitor. NPO since MN. Lab results, med rec and H&P reviewed on chart. Family to bedside.

## 2020-01-29 NOTE — Clinical Note
TRANSFER - OUT REPORT:     [facility]. Report consisted of patient's Situation, Background, Assessment and   Recommendations(SBAR). Opportunity for questions and clarification was provided.

## 2020-01-29 NOTE — DISCHARGE INSTRUCTIONS
Remove dressing in 24 hours. DISCHARGE SUMMARY from Nurse    PATIENT INSTRUCTIONS:    After general anesthesia or intravenous sedation, for 24 hours or while taking prescription Narcotics:  · Limit your activities  · Do not drive and operate hazardous machinery  · Do not make important personal or business decisions  · Do  not drink alcoholic beverages  · If you have not urinated within 8 hours after discharge, please contact your surgeon on call. Report the following to your surgeon:  · Excessive pain, swelling, redness or odor of or around the surgical area  · Temperature over 100.5  · Nausea and vomiting lasting longer than 4 hours or if unable to take medications  · Any signs of decreased circulation or nerve impairment to extremity: change in color, persistent  numbness, tingling, coldness or increase pain  · Any questions    What to do at Home:  Recommended activity: Activity as tolerated. If you experience any of the following symptoms pain that is not relieved with over the counter medications, please follow up with Dr Keo Dejesus office. *  Please give a list of your current medications to your Primary Care Provider. *  Please update this list whenever your medications are discontinued, doses are      changed, or new medications (including over-the-counter products) are added. *  Please carry medication information at all times in case of emergency situations. These are general instructions for a healthy lifestyle:    No smoking/ No tobacco products/ Avoid exposure to second hand smoke  Surgeon General's Warning:  Quitting smoking now greatly reduces serious risk to your health.     Obesity, smoking, and sedentary lifestyle greatly increases your risk for illness    A healthy diet, regular physical exercise & weight monitoring are important for maintaining a healthy lifestyle    You may be retaining fluid if you have a history of heart failure or if you experience any of the following symptoms:  Weight gain of 3 pounds or more overnight or 5 pounds in a week, increased swelling in our hands or feet or shortness of breath while lying flat in bed. Please call your doctor as soon as you notice any of these symptoms; do not wait until your next office visit. The discharge information has been reviewed with the patient and spouse. The patient and spouse verbalized understanding. Discharge medications reviewed with the patient and spouse and appropriate educational materials and side effects teaching were provided.   ___________________________________________________________________________________________________________________________________

## 2020-01-29 NOTE — Clinical Note
TRANSFER - IN REPORT:     Verbal report received from: Janae Monday. Report consisted of patient's Situation, Background, Assessment and   Recommendations(SBAR). Opportunity for questions and clarification was provided. Assessment completed upon patient's arrival to unit and care assumed.

## 2020-01-29 NOTE — PROGRESS NOTES
TRANSFER - OUT REPORT:    Verbal report given to [facility] (name) on [employer]. remaining in Bed 19 for ordered procedure       Report consisted of patients Situation, Background, Assessment and   Recommendations(SBAR). Information from the following report(s) SBAR, OR Summary and Pre Procedure Checklist was reviewed with the receiving nurse. Opportunity for questions and clarification was provided.

## 2020-01-29 NOTE — PROGRESS NOTES
Discharge medications reviewed with patient and spouse and appropriate educational materials and side effects teaching were provided. Patient and wife were given the opportunity to ask questions and they stated that they did not have any at this time.

## 2020-01-30 ENCOUNTER — ANESTHESIA EVENT (OUTPATIENT)
Dept: ENDOSCOPY | Age: 75
End: 2020-01-30
Payer: MEDICARE

## 2020-01-31 ENCOUNTER — HOSPITAL ENCOUNTER (OUTPATIENT)
Age: 75
Setting detail: OUTPATIENT SURGERY
Discharge: HOME | End: 2020-01-31
Attending: INTERNAL MEDICINE | Admitting: INTERNAL MEDICINE
Payer: MEDICARE

## 2020-01-31 ENCOUNTER — ANESTHESIA (OUTPATIENT)
Dept: ENDOSCOPY | Age: 75
End: 2020-01-31
Payer: MEDICARE

## 2020-01-31 VITALS
RESPIRATION RATE: 14 BRPM | HEIGHT: 70 IN | BODY MASS INDEX: 26.54 KG/M2 | SYSTOLIC BLOOD PRESSURE: 127 MMHG | TEMPERATURE: 97.6 F | DIASTOLIC BLOOD PRESSURE: 74 MMHG | WEIGHT: 185.4 LBS | HEART RATE: 62 BPM | OXYGEN SATURATION: 100 %

## 2020-01-31 PROCEDURE — 74011250636 HC RX REV CODE- 250/636: Performed by: NURSE ANESTHETIST, CERTIFIED REGISTERED

## 2020-01-31 PROCEDURE — 77030038604 HC SNR ENDO EXACTO USEN -B: Performed by: INTERNAL MEDICINE

## 2020-01-31 PROCEDURE — 77030021593 HC FCPS BIOP ENDOSC BSC -A: Performed by: INTERNAL MEDICINE

## 2020-01-31 PROCEDURE — C1726 CATH, BAL DIL, NON-VASCULAR: HCPCS | Performed by: INTERNAL MEDICINE

## 2020-01-31 PROCEDURE — 74011000250 HC RX REV CODE- 250: Performed by: NURSE ANESTHETIST, CERTIFIED REGISTERED

## 2020-01-31 PROCEDURE — 88305 TISSUE EXAM BY PATHOLOGIST: CPT

## 2020-01-31 PROCEDURE — 77030037186 HC VLV ENDOSC STRL DEFENDO DISP MVAT -A: Performed by: INTERNAL MEDICINE

## 2020-01-31 PROCEDURE — 76040000007: Performed by: INTERNAL MEDICINE

## 2020-01-31 PROCEDURE — 77030031670 HC DEV INFL ENDOTEK BIG60 MRTM -B: Performed by: INTERNAL MEDICINE

## 2020-01-31 PROCEDURE — 76060000032 HC ANESTHESIA 0.5 TO 1 HR: Performed by: INTERNAL MEDICINE

## 2020-01-31 RX ORDER — NALOXONE HYDROCHLORIDE 0.4 MG/ML
0.2 INJECTION, SOLUTION INTRAMUSCULAR; INTRAVENOUS; SUBCUTANEOUS AS NEEDED
Status: DISCONTINUED | OUTPATIENT
Start: 2020-01-31 | End: 2020-01-31 | Stop reason: HOSPADM

## 2020-01-31 RX ORDER — SODIUM CHLORIDE 0.9 % (FLUSH) 0.9 %
5-40 SYRINGE (ML) INJECTION AS NEEDED
Status: DISCONTINUED | OUTPATIENT
Start: 2020-01-31 | End: 2020-01-31 | Stop reason: HOSPADM

## 2020-01-31 RX ORDER — LIDOCAINE HYDROCHLORIDE 20 MG/ML
INJECTION, SOLUTION EPIDURAL; INFILTRATION; INTRACAUDAL; PERINEURAL AS NEEDED
Status: DISCONTINUED | OUTPATIENT
Start: 2020-01-31 | End: 2020-01-31 | Stop reason: HOSPADM

## 2020-01-31 RX ORDER — PROPOFOL 10 MG/ML
INJECTION, EMULSION INTRAVENOUS AS NEEDED
Status: DISCONTINUED | OUTPATIENT
Start: 2020-01-31 | End: 2020-01-31 | Stop reason: HOSPADM

## 2020-01-31 RX ORDER — SODIUM CHLORIDE 0.9 % (FLUSH) 0.9 %
5-40 SYRINGE (ML) INJECTION EVERY 8 HOURS
Status: DISCONTINUED | OUTPATIENT
Start: 2020-01-31 | End: 2020-01-31 | Stop reason: HOSPADM

## 2020-01-31 RX ORDER — SODIUM CHLORIDE, SODIUM LACTATE, POTASSIUM CHLORIDE, CALCIUM CHLORIDE 600; 310; 30; 20 MG/100ML; MG/100ML; MG/100ML; MG/100ML
100 INJECTION, SOLUTION INTRAVENOUS CONTINUOUS
Status: DISCONTINUED | OUTPATIENT
Start: 2020-01-31 | End: 2020-01-31 | Stop reason: HOSPADM

## 2020-01-31 RX ORDER — ONDANSETRON 2 MG/ML
4 INJECTION INTRAMUSCULAR; INTRAVENOUS
Status: DISCONTINUED | OUTPATIENT
Start: 2020-01-31 | End: 2020-01-31 | Stop reason: HOSPADM

## 2020-01-31 RX ORDER — SODIUM CHLORIDE, SODIUM LACTATE, POTASSIUM CHLORIDE, CALCIUM CHLORIDE 600; 310; 30; 20 MG/100ML; MG/100ML; MG/100ML; MG/100ML
50 INJECTION, SOLUTION INTRAVENOUS CONTINUOUS
Status: DISCONTINUED | OUTPATIENT
Start: 2020-01-31 | End: 2020-01-31 | Stop reason: HOSPADM

## 2020-01-31 RX ADMIN — PROPOFOL 20 MG: 10 INJECTION, EMULSION INTRAVENOUS at 09:59

## 2020-01-31 RX ADMIN — SODIUM CHLORIDE, SODIUM LACTATE, POTASSIUM CHLORIDE, AND CALCIUM CHLORIDE: 600; 310; 30; 20 INJECTION, SOLUTION INTRAVENOUS at 09:15

## 2020-01-31 RX ADMIN — LIDOCAINE HYDROCHLORIDE 60 MG: 20 INJECTION, SOLUTION INTRAVENOUS at 09:22

## 2020-01-31 RX ADMIN — PROPOFOL 20 MG: 10 INJECTION, EMULSION INTRAVENOUS at 09:57

## 2020-01-31 RX ADMIN — PROPOFOL 200 MG: 10 INJECTION, EMULSION INTRAVENOUS at 09:22

## 2020-01-31 RX ADMIN — PROPOFOL 20 MG: 10 INJECTION, EMULSION INTRAVENOUS at 10:03

## 2020-01-31 RX ADMIN — PROPOFOL 200 MG: 10 INJECTION, EMULSION INTRAVENOUS at 09:32

## 2020-01-31 RX ADMIN — PROPOFOL 20 MG: 10 INJECTION, EMULSION INTRAVENOUS at 10:08

## 2020-01-31 NOTE — PROCEDURES
Endoscopy Procedure Note    Patient: Sussy Piedra MRN: [id number]  SSN: [id number]    YOB: 1945  Age: 66 y.o. Sex: male      Date/Time:  1/31/2020 10:14 AM    Esophagogastroduodenoscopy (EGD) Procedure Note    Procedure: Esophagogastroduodenoscopy with biopsy, balloon-dilation of pyloric channel stenosis. IMPRESSION:   1. GERD without esophagitis. 2. Irregular Z line, biopsied to rule out specialized intestinal metaplasia. 3. Small, sliding, hiatus hernia. 4. Deformity and stenosis of pyloric channel, balloon dilated to 15 and 16.5 mm. RECOMMENDATIONS:  1. -Acid suppression with a proton pump inhibitor. 2. -Await pathology. Indication: GERD, Internittent nausea and vomiting. :  Rhona Craig MD  Referring Provider:   Fronie Prader, MD  History: The history and physical exam were reviewed and updated. Endoscope: [id number]  Extent of Exam: second portion of the duodenum  ASA: Per Anesthesia  Anethesia/Sedation:  MAC anesthesia    Description of the procedure: The procedure was discussed with the patient including risks, benefits, alternatives including risks of iv sedation, bleeding, perforation and aspiration. A safety timeout was performed. The patient was placed in the left lateral decubitus position. A bite block was placed. The patient was given incremental doses of intravenous sedation until moderate sedation was achieved. The patients vital signs were monitored at all times including heart rate/rhythm, blood pressure and oxygen saturation. The endoscope was then passed under direct visualization to the second portion of the duodenum. The endoscope was then slowly withdrawn while visualizing the mucosa. In the stomach a retroflexion was performed and gastric fundus and cardia visualized. The endoscope was then slowly withdrawn. The patient was then transferred to recovery in stable condition. Findings:    Esophagus: The esophageal mucosa without ulcers, erosions, inflammatory changes, strictures, or mass lesions. The Z line was slightly irregular and was biopsied to rule out specialized intestinal metaplasia. A small, sliding, hiatus hernia was seen. Stomach: The gastric mucosa was without ulcers, erosions, vascular, or mass lesions. The gastric folds were normal. There was no retained food in the stomach. Patchy erythema was seen in the antrum of the stomach. Biopsies were taken to rule out H pylori infection. The pylorus was deformed and stenosed. It was balloon-dilated to a diameter of 15 mm, and then 16.5 mm using a CRE balloon. Duodenum: The duodenum mucosa was without ulcers, erosions, inflammatory changes, vascular or mass lesions. Specimens:   ID Type Source Tests Collected by Time Destination   1 : bx irregular z-line r/o Griffiths's  Preservative Esophagus, Distal  Ashwini Cooper MD 1/31/2020 1009 Pathology   2 : (cold snare) polyp  Preservative Cecum  Ashwini Cooper MD 1/31/2020 1009 Pathology   3 : (cold snare) polyp distal ascending  Preservative Colon, Ascending  Ashwini Cooper MD 1/31/2020 1009 Pathology   4 : (cold snare) polyp  Preservative Colon, Transverse  Ashwini Cooper MD 1/31/2020 1010 Pathology               Complications:   None; patient tolerated the procedure well. EBL:Minimal    Discharge disposition:  Home in the company of  when able to ambulate    Allison Parsons MD  January 31, 2020  10:14 AM      Colonoscopy Report    Patient: Winsome Juarze MRN: [id number]  SSN: [id number]    YOB: 1945  Age: 66 y.o. Sex: male      Date of Procedure: 1/31/2020    IMPRESSION:  1. Three diminutive polyps were seen in the cecum, distal ascending colon, and transverse colon, and were cold-snared. 2. Diverticulosis of colon. 3. Internal hemorrhoids. RECOMMENDATIONS:  1. Await pathology. 2. High fiber diet.        Indication:  Personal history of colon polyps (screening only)  Procedure Performed: Colonoscopy polypectomy (cold snare)  Endoscopist: Tre Deal MD  Assistant: Raghu Brenner. ASA: Per Anesthesia  Mallampati Score: II (soft palate, uvula, fauces visible)  Anesthesia: MAC anesthesia  Endoscope: [id number]  Extent of Examination:terminal ileum  Quality of Preparation:     []  Excellent   [x]  Very Good   [] Fair but adequate   [] Fair, inadequate   []  Poor      Technique: The procedure was discussed with the patient including risks, benefits, alternatives including risks of IV sedation, bleeding, perforation and missed polyp. A safety timeout was performed. The patient's vital signs were monitored at all times including heart rate, rhythm, blood pressure and oxygen saturation. The patient was placed in left lateral position. When adequate sedation was achieved a perianal inspection and a digital rectal exam were performed. Video colonoscope was introduced into the rectum and advanced under direct vision up to the terminal ileum. The cecum was identified by IC valve, appendiceal orifice and crows foot. With adequate insufflation and maneuvering of the withdrawing scope, the colonic mucosa was visualized carefully. Retroflexion was performed in the rectum and the distal rectum visualized. The patient tolerated the procedure very well and was transferred to recovery area. Findings: No ulcers, erosions, inflammatory changes, vascular anomalies, or large mass lesions were seen. Three diminutive, 2-3 mm, sessile polyps were seen. Diverticula were seen that predominated in the left colon. Internal hemorrhoids were noted.    EBL: minimal  Specimen:   ID Type Source Tests Collected by Time Destination   1 : bx irregular z-line r/o Griffiths's  Preservative Esophagus, Distal  Daryl Pablo MD 1/31/2020 1009 Pathology   2 : (cold snare) polyp  Preservative Cecum  Daryl Pablo MD 1/31/2020 1009 Pathology   3 : (cold snare) polyp distal ascending  Preservative Colon, Ascending  Delicia Her MD 1/31/2020 1009 Pathology   4 : (cold snare) polyp  Preservative Colon, Transverse  Delicia Her MD 1/31/2020 1010 Pathology       Luisa Sullivan MD  January 31, 2020  10:14 AM

## 2020-01-31 NOTE — ANESTHESIA PREPROCEDURE EVALUATION
Relevant Problems   No relevant active problems       Anesthetic History   No history of anesthetic complications            Review of Systems / Medical History      Pulmonary  Within defined limits                 Neuro/Psych   Within defined limits           Cardiovascular    Hypertension: well controlled        Dysrhythmias       Exercise tolerance: >4 METS  Comments: 2010 SPECT: EF = 42%. 2014: Bradycardia 2/2 cardiac ablation LV tract. 2015 ECHO: EF = 55-60%. 2017 ECHO: EF = 50%. GI/Hepatic/Renal     GERD: well controlled          Comments: h/o partial gastric outlet obstruction. 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  Other Findings            Physical Exam    Airway  Mallampati: II  TM Distance: > 6 cm  Neck ROM: normal range of motion   Mouth opening: Normal     Cardiovascular  Regular rate and rhythm,  S1 and S2 normal,  no murmur, click, rub, or gallop  Rhythm: regular  Rate: normal         Dental    Dentition: Lower partial plate     Pulmonary  Breath sounds clear to auscultation               Abdominal  Abdominal exam normal       Other Findings            Anesthetic Plan    ASA: 3  Anesthesia type: MAC          Induction: Intravenous  Anesthetic plan and risks discussed with: Patient

## 2020-01-31 NOTE — ANESTHESIA POSTPROCEDURE EVALUATION
Procedure(s):  COLONOSCOPY  ESOPHAGOGASTRODUODENOSCOPY (EGD).     MAC    Anesthesia Post Evaluation      Multimodal analgesia: multimodal analgesia not used between 6 hours prior to anesthesia start to PACU discharge  Patient location during evaluation: bedside  Patient participation: complete - patient participated  Level of consciousness: awake  Pain management: adequate  Airway patency: patent  Anesthetic complications: no  Cardiovascular status: stable  Respiratory status: acceptable  Hydration status: acceptable  Post anesthesia nausea and vomiting:  controlled      Vitals Value Taken Time   /74 1/31/2020 10:17 AM   Temp     Pulse 62 1/31/2020 10:17 AM   Resp 14 1/31/2020 10:17 AM   SpO2 100 % 1/31/2020 10:17 AM

## 2020-01-31 NOTE — PERIOP NOTES
.  Pre-Op Summary    Pt arrived via car with family/friend and is oriented to time, place, person and situation. Patient with steady gait with none assistive devices. Visit Vitals  BP (!) 155/100 (BP 1 Location: Left arm, BP Patient Position: At rest)   Pulse 88   Temp 97.6 °F (36.4 °C)   Resp 18   Ht 5' 10\" (1.778 m)   Wt 84.1 kg (185 lb 6.4 oz)   SpO2 100%   BMI 26.60 kg/m²       Peripheral IV located on Right forearm. Patients belongings are located with wife. Patient's point of contact is [facility]  and their contact number is: [phone] They will be in the waiting room. They are able to receive medication information. They will be their ride home.

## 2020-01-31 NOTE — H&P
History and Physical    Galilea Lozoya Jr.  1945  [id number]  [id number]    Pre-Procedure Diagnosis:  obstruction of duodenum  k31.5 hx colon polyps  z86.010      Evaluation of past illnesses, surgeries, or injuries:   YES  Past Medical History:   Diagnosis Date    Anemia     ARF (acute renal failure) (HCC)     Bradycardia     Chronic cholecystitis     [facility] thrombosis) ([facility]     Echocardiogram 05/21/2015    EF 55-60%. No RWMA. Gr 1 DDfx. Septal knob. RVSP 25-30 mmHg. Mild MR. Mild TR. Mild PI.      ED (erectile dysfunction)     Essential hypertension     GERD (gastroesophageal reflux disease)     Hypercholesteremia     Leukocytosis     Nuclear cardiac imaging test 10/29/2010    Slightly mottled perfusion w/o focal reversible defect. EF 42%. Global hypk. May represent a nonspecific cardiomyopathy.  Pancreatitis     Prostate CA ([facility] [address]     Stage [id number] Junaid sum 3+3, s/p DVP 5/24/07      Ventral hernia      Past Surgical History:   Procedure Laterality Date    HX CHOLECYSTECTOMY  11/18/10    HX CYST REMOVAL  08/15/12    Right Back    HX HERNIA REPAIR  1/2012    HX KNEE ARTHROSCOPY      HX OTHER SURGICAL      ERCP    HX PROSTATECTOMY  05-24-07    Stage [id number] Junaid sum 3+3    HX TRACHEOSTOMY  9/7/10    CT REMOVAL SUBCUTANEOUS CARDIAC RHYTHM MONITOR Left 1/29/2020    LOOP RECORDER REMOVAL performed by Ceferino Baez MD at [facility] CATH LAB    VASCULAR SURGERY PROCEDURE UNLIST      Filter       Allergies: Allergies   Allergen Reactions    Hydrocod-Cpm-Pe-Acetaminophen Nausea and Vomiting       Previous reactions to sedation/analgesia?   NO    Review of current medications, supplement, herbals and nutraceuticals complete:  YES  Current Facility-Administered Medications   Medication Dose Route Frequency Provider Last Rate Last Dose    sodium chloride (NS) flush 5-40 mL  5-40 mL IntraVENous Q8H Elodia Patel CRNA        sodium chloride (NS) flush 5-40 mL  5-40 mL IntraVENous PRN Taran Pressley CRNA        lactated Ringers infusion  50 mL/hr IntraVENous CONTINUOUS Taran Pressley CRNA              Pertinent labs reviewed? YES    History of substance abuse?   NO  Family History   Problem Relation Age of Onset    Cancer Mother         Pancreatic    Hypertension Mother     Heart Disease Father     Hypertension Maternal Grandmother      Social History     Socioeconomic History    Marital status:      Spouse name: Not on file    Number of children: Not on file    Years of education: Not on file    Highest education level: Not on file   Occupational History    Not on file   Social Needs    Financial resource strain: Not on file    Food insecurity:     Worry: Not on file     Inability: Not on file    Transportation needs:     Medical: Not on file     Non-medical: Not on file   Tobacco Use    Smoking status: Former Smoker     Packs/day: 1.00     Years: 19.00     Pack years: 19.00     Last attempt to quit: 1984     Years since quittin.8    Smokeless tobacco: Never Used   Substance and Sexual Activity    Alcohol use: Yes     Comment: very seldom    Drug use: No    Sexual activity: Yes     Partners: Female     Birth control/protection: None   Lifestyle    Physical activity:     Days per week: Not on file     Minutes per session: Not on file    Stress: Not on file   Relationships    Social connections:     Talks on phone: Not on file     Gets together: Not on file     Attends Restorationist service: Not on file     Active member of club or organization: Not on file     Attends meetings of clubs or organizations: Not on file     Relationship status: Not on file    Intimate partner violence:     Fear of current or ex partner: Not on file     Emotionally abused: Not on file     Physically abused: Not on file     Forced sexual activity: Not on file   Other Topics Concern    Not on file   Social History Narrative    Not on file Cardiac Status:  WNL    Mental Status:  WNL     Pulmonary Status:  WNL    NPO:  5-8    Assessment/Impression: Duodenal obstruction, hx of colon polyps.      Plan of treatment: EGD and colonoscopy        Allison Parsons MD  1/31/2020  7:50 AM

## 2020-01-31 NOTE — DISCHARGE INSTRUCTIONS
DISCHARGE SUMMARY from Nurse    PATIENT INSTRUCTIONS:    After general anesthesia or intravenous sedation, for 24 hours or while taking prescription Narcotics:  · Limit your activities  · Do not drive and operate hazardous machinery  · Do not make important personal or business decisions  · Do  not drink alcoholic beverages  · If you have not urinated within 8 hours after discharge, please contact your surgeon on call. Report the following to your surgeon:  · Excessive pain, swelling, redness or odor of or around the surgical area  · Temperature over 100.5  · Nausea and vomiting lasting longer than 4 hours or if unable to take medications  · Any signs of decreased circulation or nerve impairment to extremity: change in color, persistent  numbness, tingling, coldness or increase pain  · Any questions      *  Please update this list whenever your medications are discontinued, doses are      changed, or new medications (including over-the-counter products) are added. *  Please carry medication information at all times in case of emergency situations. These are general instructions for a healthy lifestyle:    No smoking/ No tobacco products/ Avoid exposure to second hand smoke  Surgeon General's Warning:  Quitting smoking now greatly reduces serious risk to your health. Obesity, smoking, and sedentary lifestyle greatly increases your risk for illness    A healthy diet, regular physical exercise & weight monitoring are important for maintaining a healthy lifestyle    You may be retaining fluid if you have a history of heart failure or if you experience any of the following symptoms:  Weight gain of 3 pounds or more overnight or 5 pounds in a week, increased swelling in our hands or feet or shortness of breath while lying flat in bed. Please call your doctor as soon as you notice any of these symptoms; do not wait until your next office visit. Patient armband removed and given to patient to take home. Patient was informed of the privacy risks if armband lost or stolen  The discharge information has been reviewed with the patient. The patient verbalized understanding. Discharge medications reviewed with the patient and appropriate educational materials and side effects teaching were provided. ___________________________________________________________________________________________________________________________________      For the next 12 hours you should not:   1. drive   2. drink alcohol   3. operate any machinery   4. engage in activities that require mental sharpness or manual dexterity such as     cooking   5. take any drugs other than those prescribed by a physician   6. make any legal or financial decisions  Call your doctor's office immediately, if:   1. increased and continuing rectal bleeding   2. fever   3. increased abdominal pain    Take it easy today and resume normal activity tomorrow. Resume previous diet.

## 2020-07-01 ENCOUNTER — OFFICE VISIT (OUTPATIENT)
Dept: CARDIOLOGY CLINIC | Age: 75
End: 2020-07-01

## 2020-07-01 VITALS
WEIGHT: 184 LBS | OXYGEN SATURATION: 99 % | SYSTOLIC BLOOD PRESSURE: 134 MMHG | HEART RATE: 78 BPM | BODY MASS INDEX: 26.34 KG/M2 | DIASTOLIC BLOOD PRESSURE: 86 MMHG | HEIGHT: 70 IN

## 2020-07-01 DIAGNOSIS — I49.3 PVC (PREMATURE VENTRICULAR CONTRACTION): Primary | ICD-10-CM

## 2020-07-01 DIAGNOSIS — I47.1 SVT (SUPRAVENTRICULAR TACHYCARDIA) (HCC): ICD-10-CM

## 2020-07-01 DIAGNOSIS — Z98.890 S/P ABLATION OPERATION FOR ARRHYTHMIA: ICD-10-CM

## 2020-07-01 DIAGNOSIS — I10 ESSENTIAL HYPERTENSION: ICD-10-CM

## 2020-07-01 DIAGNOSIS — R42 DIZZY SPELLS: ICD-10-CM

## 2020-07-01 DIAGNOSIS — Z86.79 S/P ABLATION OPERATION FOR ARRHYTHMIA: ICD-10-CM

## 2020-07-01 DIAGNOSIS — R00.1 BRADYCARDIA: ICD-10-CM

## 2020-07-01 NOTE — PROGRESS NOTES
HPI: A 68-year old male here for follow up. Overall, he is doing quite well. I explanted his loop recorder January 2020. He has had no new chest pain, dyspnea, orthopnea, PND or edema. He has had no palpitations since losartan was increased from 25 mg to 50 mg. His blood pressure has been well controlled. He also pays attention to salt intake. Impression/Plan:  1. History of subcutaneous loop recorder placed April 2016 and explanted January 2020.   2. History of hypertension improved on losartan. 3. EP study September 2017 with nonsustained arrhythmias except for a short run of possible atrial tachycardia not more than one minute. Her never had any events on his loop recorder. 4. History of bradycardia with heart rate down to the 40s but nothing significant on loop recorder to warrant pacemaker, no symptoms. 5. History of PVCs with remote ablation by Dr. Johan Baker. 6. History of gastric outflow obstruction and remote pancreatitis. 7. Remote cardiomyopathy with ejection fraction improving to normal 2017. At this point he will continue to monitor his blood pressure. He remains active without limitations. No symptoms from any of his arrhythmias. I will plan to see back on an annual basis and consider echocardiogram at that time. Past Medical History:   Diagnosis Date    Anemia     [facility]     Bradycardia     Chronic cholecystitis     [facility] thrombosis) ([facility])     Echocardiogram 05/21/2015    EF 55-60%. No RWMA. Gr 1 DDfx. Septal knob. RVSP 25-30 mmHg. Mild MR. Mild TR. Mild PI.      ED (erectile dysfunction)     Essential hypertension     GERD (gastroesophageal reflux disease)     Hypercholesteremia     Leukocytosis     Nuclear cardiac imaging test 10/29/2010    Slightly mottled perfusion w/o focal reversible defect. EF 42%. Global hypk. May represent a nonspecific cardiomyopathy.     Pancreatitis     Prostate CA ([facility] [address]     Stage [id number] [place] sum 3+3, s/p DVP 5/24/07      Ventral hernia        Current Outpatient Medications   Medication Sig Dispense Refill    docosahexaenoic acid/epa (FISH OIL PO) Take  by mouth.  losartan (COZAAR) 25 mg tablet Take 1 Tab by mouth daily. (Patient taking differently: Take 50 mg by mouth daily.) 30 Tab 6    lansoprazole (PREVACID) 30 mg capsule Take  by mouth Daily (before breakfast).  Cholecalciferol, Vitamin D3, (VITAMIN D3) 1,000 unit cap Take  by mouth.  therapeutic multivitamin (THERAGRAN) tablet Take 1 tablet by mouth daily. Social History   reports that he quit smoking about 36 years ago. He has a 19.00 pack-year smoking history. He has never used smokeless tobacco.   reports current alcohol use. Family History  family history includes Cancer in his mother; Heart Disease in his father; Hypertension in his maternal grandmother and mother. Review of Systems  Except as stated above include:  Constitutional: Negative for fever, chills and malaise/fatigue. HEENT: No congestion or recent URI. Gastrointestinal: No nausea, vomiting, abdominal pain, bloody stools. Pulmonary:  Negative except as stated above. Cardiac:  Negative except as stated above. Musculoskeletal: Negative except as stated above. Neurological:  No localized symptoms. Skin:  Negative except as stated above. Psych:  Negative except as stated above. Endocrine:  Negative except as stated above. PHYSICAL EXAM  BP Readings from Last 3 Encounters:   07/01/20 134/86   01/31/20 127/74   01/29/20 131/85     Pulse Readings from Last 3 Encounters:   07/01/20 78   01/31/20 62   01/29/20 66     Wt Readings from Last 3 Encounters:   07/01/20 83.5 kg (184 lb)   01/31/20 84.1 kg (185 lb 6.4 oz)   01/29/20 82.1 kg (181 lb)     General:   Well developed, well groomed. Head/Neck:   No jugular venous distention     No carotid bruits. No evidence of xanthelasma. Lungs:   No respiratory distress. Clear bilaterally.   Heart: Regular rate and rhythm. Normal S1/S2. Palpation of heart with normal point of maximum impulse. No significant murmurs, rubs or gallops. Abdomen:   Soft and nontender. No palpable abdominal mass or bruits. Extremities:   Intact peripheral pulses. No significant edema. Neurological:   Alert and oriented to person, place, time. No focal neurological deficit visually. Skin:   No obvious rash    Blood Pressure Metric:  Monitor recommended and adjustments stated if needed.

## 2020-07-01 NOTE — PROGRESS NOTES
Yadi Garcia. presents today for   Chief Complaint   Patient presents with    Follow-up     5 month follow up       Yadidave Garcia. preferred language for health care discussion is english/other. Is someone accompanying this pt? Yes his wife    Is the patient using any DME equipment during [address]? no    Depression Screening:  3 most recent PHQ Screens 7/1/2020   Little interest or pleasure in doing things Not at all   Feeling down, depressed, irritable, or hopeless Not at all   Total Score PHQ 2 0       Learning Assessment:  Learning Assessment 8/24/2017   PRIMARY LEARNER Patient   PRIMARY LANGUAGE ENGLISH   LEARNER PREFERENCE PRIMARY DEMONSTRATION   ANSWERED BY Patient   RELATIONSHIP SELF       Abuse Screening:  Abuse Screening Questionnaire 7/1/2020   Do you ever feel afraid of your partner? N   Are you in a relationship with someone who physically or mentally threatens you? N   Is it safe for you to go home? Y       Fall Risk  Fall Risk Assessment, last 12 mths 7/1/2020   Able to walk? Yes   Fall in past 12 months? No       Pt currently taking Anticoagulant therapy? no    Coordination of Care:  1. Have you been to the ER, urgent care clinic since your last visit? Hospitalized since your last visit? no    2. Have you seen or consulted any other health care providers outside of the [address] [place] [address] since your last visit? Include any pap smears or colon screening.  no

## 2020-09-09 NOTE — PROGRESS NOTES
I have personally seen and evaluated the device findings. Interrogation reviewed and I agree with assessment.     Emery Cool none

## 2021-08-05 ENCOUNTER — OFFICE VISIT (OUTPATIENT)
Dept: CARDIOLOGY CLINIC | Age: 76
End: 2021-08-05
Payer: MEDICARE

## 2021-08-05 VITALS
SYSTOLIC BLOOD PRESSURE: 138 MMHG | HEART RATE: 91 BPM | HEIGHT: 70 IN | BODY MASS INDEX: 27.06 KG/M2 | DIASTOLIC BLOOD PRESSURE: 88 MMHG | OXYGEN SATURATION: 98 % | WEIGHT: 189 LBS

## 2021-08-05 DIAGNOSIS — R00.1 BRADYCARDIA: ICD-10-CM

## 2021-08-05 DIAGNOSIS — I47.1 SVT (SUPRAVENTRICULAR TACHYCARDIA) (HCC): ICD-10-CM

## 2021-08-05 DIAGNOSIS — I42.9 CARDIOMYOPATHY, UNSPECIFIED TYPE (HCC): Primary | ICD-10-CM

## 2021-08-05 DIAGNOSIS — R42 DIZZY SPELLS: ICD-10-CM

## 2021-08-05 DIAGNOSIS — I49.3 PVC (PREMATURE VENTRICULAR CONTRACTION): ICD-10-CM

## 2021-08-05 DIAGNOSIS — Z98.890 S/P ABLATION OPERATION FOR ARRHYTHMIA: ICD-10-CM

## 2021-08-05 DIAGNOSIS — Z86.79 S/P ABLATION OPERATION FOR ARRHYTHMIA: ICD-10-CM

## 2021-08-05 DIAGNOSIS — I10 ESSENTIAL HYPERTENSION: ICD-10-CM

## 2021-08-05 PROCEDURE — G8754 DIAS BP LESS 90: HCPCS | Performed by: INTERNAL MEDICINE

## 2021-08-05 PROCEDURE — 1101F PT FALLS ASSESS-DOCD LE1/YR: CPT | Performed by: INTERNAL MEDICINE

## 2021-08-05 PROCEDURE — G8536 NO DOC ELDER MAL SCRN: HCPCS | Performed by: INTERNAL MEDICINE

## 2021-08-05 PROCEDURE — 99214 OFFICE O/P EST MOD 30 MIN: CPT | Performed by: INTERNAL MEDICINE

## 2021-08-05 PROCEDURE — 93000 ELECTROCARDIOGRAM COMPLETE: CPT | Performed by: INTERNAL MEDICINE

## 2021-08-05 PROCEDURE — G8427 DOCREV CUR MEDS BY ELIG CLIN: HCPCS | Performed by: INTERNAL MEDICINE

## 2021-08-05 PROCEDURE — G8510 SCR DEP NEG, NO PLAN REQD: HCPCS | Performed by: INTERNAL MEDICINE

## 2021-08-05 PROCEDURE — G8419 CALC BMI OUT NRM PARAM NOF/U: HCPCS | Performed by: INTERNAL MEDICINE

## 2021-08-05 PROCEDURE — G8752 SYS BP LESS 140: HCPCS | Performed by: INTERNAL MEDICINE

## 2021-08-05 RX ORDER — AMLODIPINE BESYLATE 2.5 MG/1
2.5 TABLET ORAL DAILY
COMMUNITY

## 2021-08-05 RX ORDER — LOSARTAN POTASSIUM 100 MG/1
100 TABLET ORAL DAILY
COMMUNITY
Start: 2021-07-29

## 2021-08-05 RX ORDER — ICOSAPENT ETHYL 1000 MG/1
CAPSULE ORAL 2 TIMES DAILY WITH MEALS
COMMUNITY

## 2021-08-05 NOTE — PROGRESS NOTES
History of Present Illness:  64year old male here for follow up. Overall he is doing well. No new chest pain, dyspnea, PND, orthopnea or edema. His Losartan has been up-titrated to 100 mg daily and he is taking Norvasc 2.5 mg daily. Overall his blood pressure logbook is very reasonable. He has rare episodes where his systolic will go to 62H-06O with rare dizziness, but overall doing well. Impression:  1. History of hypertension, improved on Losartan and Amlodipine. 2. History of subcutaneous loop recorder placed April, 2016, explanted January, 2020, as he has an EP study September, 2017 with nonsustained arrhythmias, short run of possible atrial tachycardia, not more than a minute. Never had any events on his loop recorder. 3. History of bradycardia with heart rate down to 40s, but nothing sustained on his loop recorder or pacemaker, no symptoms at this time. 4. History of PVCs with remote ablation by Dr. Roman Elise. 5. History of gastric outflow obstruction and remote pancreatitis. 6. Remote transient cardiomyopathy with EF improving to normal in 2017. Plan:  Blood pressure is much better. He remains active without limitations. Given his previous transient cardiomyopathy, as well as atrial ectopy previously, I would like to obtain an echocardiogram to make sure there is no change in heart function or left atrial size that may increase his risk for atrial fibrillation. All questions answered, and if his echocardiogram is normal, I will see him back in a year. Past Medical History:   Diagnosis Date    Anemia     [facility]     Bradycardia     Chronic cholecystitis     [facility]) ([facility])     Echocardiogram 05/21/2015    EF 55-60%. No RWMA. Gr 1 DDfx. Septal knob. RVSP 25-30 mmHg. Mild MR. Mild TR.   Mild PI.      ED (erectile dysfunction)     Essential hypertension     GERD (gastroesophageal reflux disease)     Hypercholesteremia     Leukocytosis     Nuclear cardiac imaging test 10/29/2010    Slightly mottled perfusion w/o focal reversible defect. EF 42%. Global hypk. May represent a nonspecific cardiomyopathy.  Pancreatitis     Prostate CA ([facility] [address]     Stage [id number] [place] sum 3+3, s/p DVP 5/24/07      Ventral hernia        Current Outpatient Medications   Medication Sig Dispense Refill    losartan (COZAAR) 100 mg tablet Take 100 mg by mouth daily.  amLODIPine (NORVASC) 2.5 mg tablet Take 2.5 mg by mouth daily.  zinc 50 mg tab tablet Take 50 mg by mouth daily.  icosapent ethyL (Vascepa) 1 gram capsule Take  by mouth two (2) times daily (with meals).  lansoprazole (PREVACID) 30 mg capsule Take  by mouth Daily (before breakfast).  Cholecalciferol, Vitamin D3, (VITAMIN D3) 1,000 unit cap Take  by mouth.  therapeutic multivitamin (THERAGRAN) tablet Take 1 tablet by mouth daily. Social History   reports that he quit smoking about 37 years ago. He has a 19.00 pack-year smoking history. He has never used smokeless tobacco.   reports current alcohol use. Family History  family history includes Cancer in his mother; Heart Disease in his father; Hypertension in his maternal grandmother and mother. Review of Systems  Except as stated above include:  Constitutional: Negative for fever, chills and malaise/fatigue. HEENT: No congestion or recent URI. Gastrointestinal: No nausea, vomiting, abdominal pain, bloody stools. Pulmonary:  Negative except as stated above. Cardiac:  Negative except as stated above. Musculoskeletal: Negative except as stated above. Neurological:  No localized symptoms. Skin:  Negative except as stated above. Psych:  Negative except as stated above. Endocrine:  Negative except as stated above.     PHYSICAL EXAM  BP Readings from Last 3 Encounters:   08/05/21 138/88   07/01/20 134/86   01/31/20 127/74     Pulse Readings from Last 3 Encounters:   08/05/21 91   07/01/20 78 01/31/20 62     Wt Readings from Last 3 Encounters:   08/05/21 85.7 kg (189 lb)   07/01/20 83.5 kg (184 lb)   01/31/20 84.1 kg (185 lb 6.4 oz)     General:   Well developed, well groomed. Head/Neck:   No obvious jugular venous distention     No obvious carotid pulsations. No evidence of xanthelasma. Lungs:   No respiratory distress. Clear bilaterally. Heart:  Regular rate and rhythm. Normal S1/S2. Palpation grossly normal.    No significant murmurs, rubs or gallops. Abdomen:   Non-acute abdomen. No obvious pulsations. Extremities:   Intact peripheral pulses. No significant edema. Neurological:   Alert and oriented to person, place, time. No focal neurological deficit visually. Skin:   No obvious rash    Blood Pressure Metric:  Monitor recommended and adjustments stated if needed.

## 2021-08-05 NOTE — PROGRESS NOTES
Danna Crenshaw. presents today for   Chief Complaint   Patient presents with    Follow-up     1 year follow up       Danna Crenshaw. preferred language for health care discussion is english/other. Is someone accompanying this pt? yes    Is the patient using any DME equipment during [address]? no    Depression Screening:  3 most recent PHQ Screens 8/5/2021   Little interest or pleasure in doing things Not at all   Feeling down, depressed, irritable, or hopeless Not at all   Total Score PHQ 2 0       Learning Assessment:  Learning Assessment 8/5/2021   PRIMARY LEARNER Patient   PRIMARY LANGUAGE ENGLISH   LEARNER PREFERENCE PRIMARY DEMONSTRATION   ANSWERED BY patient   RELATIONSHIP SELF       Abuse Screening:  Abuse Screening Questionnaire 8/5/2021   Do you ever feel afraid of your partner? N   Are you in a relationship with someone who physically or mentally threatens you? N   Is it safe for you to go home? Y       Fall Risk  Fall Risk Assessment, last 12 mths 8/5/2021   Able to walk? Yes   Fall in past 12 months? 0   Do you feel unsteady? 0   Are you worried about falling 0           Pt currently taking Anticoagulant therapy? no    Pt currently taking Antiplatelet therapy ? no      Coordination of Care:  1. Have you been to the ER, urgent care clinic since your last visit? Hospitalized since your last visit? no    2. Have you seen or consulted any other health care providers outside of the [address] [place] [address] since your last visit? Include any pap smears or colon screening.  no

## 2022-06-01 NOTE — Clinical Note
----- Message from Mellisa Egan MD sent at 5/31/2022  5:27 PM CDT -----  I wanted to get back to you with your colonoscopy results. You had 2 colon polyps removed which were benign. I would advise a repeat colonoscopy in 3 years to make sure no new polyps are forming. You also have internal hemorrhoids and diverticulosis. Please stay on a high fiber diet and call with any questions. clipped, prepped with ChloraPrep and draped. Wet prep solution dried at: 3.

## 2022-08-04 ENCOUNTER — OFFICE VISIT (OUTPATIENT)
Dept: CARDIOLOGY CLINIC | Age: 77
End: 2022-08-04
Payer: MEDICARE

## 2022-08-04 VITALS
SYSTOLIC BLOOD PRESSURE: 112 MMHG | HEART RATE: 76 BPM | BODY MASS INDEX: 26.63 KG/M2 | DIASTOLIC BLOOD PRESSURE: 84 MMHG | WEIGHT: 186 LBS | HEIGHT: 70 IN | OXYGEN SATURATION: 98 %

## 2022-08-04 DIAGNOSIS — I10 ESSENTIAL HYPERTENSION: ICD-10-CM

## 2022-08-04 DIAGNOSIS — Z98.890 S/P ABLATION OPERATION FOR ARRHYTHMIA: ICD-10-CM

## 2022-08-04 DIAGNOSIS — R00.1 BRADYCARDIA: ICD-10-CM

## 2022-08-04 DIAGNOSIS — I49.3 PVC (PREMATURE VENTRICULAR CONTRACTION): ICD-10-CM

## 2022-08-04 DIAGNOSIS — R42 DIZZY SPELLS: ICD-10-CM

## 2022-08-04 DIAGNOSIS — G31.84 MCI (MILD COGNITIVE IMPAIRMENT): ICD-10-CM

## 2022-08-04 DIAGNOSIS — Z86.79 S/P ABLATION OPERATION FOR ARRHYTHMIA: ICD-10-CM

## 2022-08-04 DIAGNOSIS — I47.1 SVT (SUPRAVENTRICULAR TACHYCARDIA) (HCC): Primary | ICD-10-CM

## 2022-08-04 DIAGNOSIS — E78.5 DYSLIPIDEMIA: ICD-10-CM

## 2022-08-04 PROCEDURE — G8536 NO DOC ELDER MAL SCRN: HCPCS | Performed by: INTERNAL MEDICINE

## 2022-08-04 PROCEDURE — G8754 DIAS BP LESS 90: HCPCS | Performed by: INTERNAL MEDICINE

## 2022-08-04 PROCEDURE — 1101F PT FALLS ASSESS-DOCD LE1/YR: CPT | Performed by: INTERNAL MEDICINE

## 2022-08-04 PROCEDURE — G8510 SCR DEP NEG, NO PLAN REQD: HCPCS | Performed by: INTERNAL MEDICINE

## 2022-08-04 PROCEDURE — 93000 ELECTROCARDIOGRAM COMPLETE: CPT | Performed by: INTERNAL MEDICINE

## 2022-08-04 PROCEDURE — 1123F ACP DISCUSS/DSCN MKR DOCD: CPT | Performed by: INTERNAL MEDICINE

## 2022-08-04 PROCEDURE — 99214 OFFICE O/P EST MOD 30 MIN: CPT | Performed by: INTERNAL MEDICINE

## 2022-08-04 PROCEDURE — G8417 CALC BMI ABV UP PARAM F/U: HCPCS | Performed by: INTERNAL MEDICINE

## 2022-08-04 PROCEDURE — G8427 DOCREV CUR MEDS BY ELIG CLIN: HCPCS | Performed by: INTERNAL MEDICINE

## 2022-08-04 PROCEDURE — G8752 SYS BP LESS 140: HCPCS | Performed by: INTERNAL MEDICINE

## 2022-08-04 NOTE — PROGRESS NOTES
Claudia Claudette. presents today for   Chief Complaint   Patient presents with    Follow-up     1 year    Dizziness       Claudia Wilkins. preferred language for health care discussion is english/other. Is someone accompanying this pt? yes    Is the patient using any DME equipment during [address]? no    Depression Screening:  3 most recent PHQ Screens 8/4/2022   Little interest or pleasure in doing things Not at all   Feeling down, depressed, irritable, or hopeless Not at all   Total Score PHQ 2 0       Learning Assessment:  Learning Assessment 8/4/2022   PRIMARY LEARNER Patient   PRIMARY LANGUAGE ENGLISH   LEARNER PREFERENCE PRIMARY DEMONSTRATION   ANSWERED BY patient   RELATIONSHIP SELF       Abuse Screening:  Abuse Screening Questionnaire 8/4/2022   Do you ever feel afraid of your partner? N   Are you in a relationship with someone who physically or mentally threatens you? N   Is it safe for you to go home? Y       Fall Risk  Fall Risk Assessment, last 12 mths 8/4/2022   Able to walk? Yes   Fall in past 12 months? 0   Do you feel unsteady? 0   Are you worried about falling 0           Pt currently taking Anticoagulant therapy? no    Pt currently taking Antiplatelet therapy ? no      Coordination of Care:  1. Have you been to the ER, urgent care clinic since your last visit? Hospitalized since your last visit? no    2. Have you seen or consulted any other health care providers outside of the [address] [place] [address] since your last visit? Include any pap smears or colon screening.  no

## 2022-08-04 NOTE — PROGRESS NOTES
History of Present Illness:  76 YOM here for follow up. Over the past year he has done relatively well. He has had multiple neurocognitive tests and he has been diagnosed with mild cognitive impairment. No new chest pain, dyspnea, PND, orthopnea or edema. He had some vertigo in the ER in January. He was out mowing the lawn about a month ago with severe heat in the high 90s and he was very diaphoretic, but it quickly passed. Impression:  History of HTN, improved on Losartan and Amlodipine. If he is out in the heat consistently it may be reasonable to stop his Norvasc. History of bradycardia with heart rate down to 40s, but nothing sustained on his previous loop recorder. It was placed in 2016 and implanted in 2020. History of EP study September 2017 with short runs of atrial tachycardia, not more than a minute, but again, never any events on his loop recorder. History of PVCs and remote ablation by Dr. Faustino Walsh. History of gastric outflow obstruction and remote pancreatitis. Remote transient cardiomyopathy with EF normal in 2017, recheck August 2021 with normal function. Mild cognitive impairment, following with neurology. Plan:  He is in sinus rhythm today, his blood pressure is controlled and he is doing quite well. No changes in medications for now. If his blood pressure tends to trend down I would stop the Norvasc, but will continue for now. He has also been placed on a cholesterol medicine by his primary. Past Medical History:   Diagnosis Date    Anemia     ARF (acute renal failure) ([facility])     Bradycardia     Chronic cholecystitis     DVT (deep venous thrombosis) ([facility])     Echocardiogram 05/21/2015    EF 55-60%. No RWMA. Gr 1 DDfx. Septal knob. RVSP 25-30 mmHg. Mild MR. Mild TR.   Mild PI.      ED (erectile dysfunction)     Essential hypertension     GERD (gastroesophageal reflux disease)     Hypercholesteremia     Leukocytosis     Nuclear cardiac imaging test 10/29/2010 Slightly mottled perfusion w/o focal reversible defect. EF 42%. Global hypk. May represent a nonspecific cardiomyopathy. 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 losartan (COZAAR) 100 mg tablet Take 100 mg by mouth daily. amLODIPine (NORVASC) 2.5 mg tablet Take 2.5 mg by mouth daily. zinc 50 mg tab tablet Take 50 mg by mouth daily. icosapent ethyL (VASCEPA) 1 gram capsule Take  by mouth two (2) times daily (with meals). lansoprazole (PREVACID) 30 mg capsule Take  by mouth Daily (before breakfast). cholecalciferol (VITAMIN D3) 25 mcg (1,000 unit) cap Take  by mouth. therapeutic multivitamin (THERAGRAN) tablet Take 1 tablet by mouth daily. Social History   reports that he quit smoking about 38 years ago. His smoking use included cigarettes. He has a 19.00 pack-year smoking history. He has never used smokeless tobacco.   reports current alcohol use. Family History  family history includes Cancer in his mother; Heart Disease in his father; Hypertension in his maternal grandmother and mother. Review of Systems  Except as stated above include:  Constitutional: Negative for fever, chills and malaise/fatigue. HEENT: No congestion or recent URI. Gastrointestinal: No nausea, vomiting, abdominal pain, bloody stools. Pulmonary:  Negative except as stated above. Cardiac:  Negative except as stated above. Musculoskeletal: Negative except as stated above. Neurological:  No localized symptoms. Skin:  Negative except as stated above. Psych:  Negative except as stated above. Endocrine:  Negative except as stated above.     PHYSICAL EXAM  BP Readings from Last 3 Encounters:   08/04/22 112/84   02/02/22 135/78   08/27/21 138/88     Pulse Readings from Last 3 Encounters:   08/04/22 76   02/02/22 76   08/05/21 91     Wt Readings from Last 3 Encounters:   08/04/22 84.4 kg (186 lb)   02/02/22 83 kg (182 lb 15.7 oz)   08/27/21 85.7 kg (189 lb)     General:   Well developed, well groomed. Head/Neck:   No obvious jugular venous distention     No obvious carotid pulsations. No evidence of xanthelasma. Lungs:   No respiratory distress. Clear bilaterally. Heart:  Regular rate and rhythm. Normal S1/S2. Palpation grossly normal.    No significant murmurs, rubs or gallops. Abdomen:   Non-acute abdomen. No obvious pulsations. Extremities:   Intact peripheral pulses. No significant edema. Neurological:   Alert and oriented to person, place, time. No focal neurological deficit visually. Skin:   No obvious rash    Blood Pressure Metric:  Monitor recommended and adjustments stated if needed.

## 2022-11-01 ENCOUNTER — HOSPITAL ENCOUNTER (OUTPATIENT)
Dept: LAB | Age: 77
Discharge: HOME | End: 2022-11-01
Payer: MEDICARE

## 2022-11-01 LAB
ALBUMIN SERPL-MCNC: 4 G/DL (ref 3.4–5)
ALBUMIN/GLOB SERPL: 1.1 {RATIO} (ref 0.8–1.7)
ALP SERPL-CCNC: 74 U/L (ref 45–117)
ALT SERPL-CCNC: 22 U/L (ref 16–61)
ANION GAP SERPL CALC-SCNC: 3 MMOL/L (ref 3–18)
APPEARANCE UR: CLEAR
AST SERPL-CCNC: 17 U/L (ref 10–38)
BASOPHILS # BLD: 0.1 K/UL (ref 0–0.1)
BASOPHILS NFR BLD: 1 % (ref 0–2)
BILIRUB SERPL-MCNC: 0.7 MG/DL (ref 0.2–1)
BILIRUB UR QL: NEGATIVE
BUN SERPL-MCNC: 17 MG/DL (ref 7–18)
BUN/CREAT SERPL: 15 (ref 12–20)
CALCIUM SERPL-MCNC: 8.9 MG/DL (ref 8.5–10.1)
CHLORIDE SERPL-SCNC: 104 MMOL/L (ref 100–111)
CHOLEST SERPL-MCNC: 115 MG/DL
CO2 SERPL-SCNC: 29 MMOL/L (ref 21–32)
COLOR UR: ABNORMAL
CREAT SERPL-MCNC: 1.11 MG/DL (ref 0.6–1.3)
DIFFERENTIAL METHOD BLD: NORMAL
EOSINOPHIL # BLD: 0.1 K/UL (ref 0–0.4)
EOSINOPHIL NFR BLD: 2 % (ref 0–5)
ERYTHROCYTE [DISTWIDTH] IN BLOOD BY AUTOMATED COUNT: 12.9 % (ref 11.6–14.5)
EST. AVERAGE GLUCOSE BLD GHB EST-MCNC: 143 MG/DL
GLOBULIN SER CALC-MCNC: 3.5 G/DL (ref 2–4)
GLUCOSE SERPL-MCNC: 121 MG/DL (ref 74–99)
GLUCOSE UR STRIP.AUTO-MCNC: NEGATIVE MG/DL
HBA1C MFR BLD: 6.6 % (ref 4.2–5.6)
HCT VFR BLD AUTO: 41.3 % (ref 36–48)
HDLC SERPL-MCNC: 44 MG/DL (ref 40–60)
HDLC SERPL: 2.6 {RATIO} (ref 0–5)
HGB BLD-MCNC: 13.6 G/DL (ref 13–16)
HGB UR QL STRIP: NEGATIVE
IMM GRANULOCYTES # BLD AUTO: 0 K/UL (ref 0–0.04)
IMM GRANULOCYTES NFR BLD AUTO: 0 % (ref 0–0.5)
KETONES UR QL STRIP.AUTO: ABNORMAL MG/DL
LDLC SERPL CALC-MCNC: 50.2 MG/DL (ref 0–100)
LEUKOCYTE ESTERASE UR QL STRIP.AUTO: NEGATIVE
LIPID PROFILE,FLP: NORMAL
LYMPHOCYTES # BLD: 1.4 K/UL (ref 0.9–3.6)
LYMPHOCYTES NFR BLD: 26 % (ref 21–52)
MCH RBC QN AUTO: 29.1 PG (ref 24–34)
MCHC RBC AUTO-ENTMCNC: 32.9 G/DL (ref 31–37)
MCV RBC AUTO: 88.2 FL (ref 78–100)
MONOCYTES # BLD: 0.5 K/UL (ref 0.05–1.2)
MONOCYTES NFR BLD: 10 % (ref 3–10)
NEUTS SEG # BLD: 3.4 K/UL (ref 1.8–8)
NEUTS SEG NFR BLD: 62 % (ref 40–73)
NITRITE UR QL STRIP.AUTO: NEGATIVE
NRBC # BLD: 0 K/UL (ref 0–0.01)
NRBC BLD-RTO: 0 PER 100 WBC
PH UR STRIP: 6 [PH] (ref 5–8)
PLATELET # BLD AUTO: 160 K/UL (ref 135–420)
PMV BLD AUTO: 10 FL (ref 9.2–11.8)
POTASSIUM SERPL-SCNC: 4.2 MMOL/L (ref 3.5–5.5)
PROT SERPL-MCNC: 7.5 G/DL (ref 6.4–8.2)
PROT UR STRIP-MCNC: NEGATIVE MG/DL
RBC # BLD AUTO: 4.68 M/UL (ref 4.35–5.65)
SODIUM SERPL-SCNC: 136 MMOL/L (ref 136–145)
SP GR UR REFRACTOMETRY: 1.02 (ref 1–1.03)
TRIGL SERPL-MCNC: 104 MG/DL (ref ?–150)
TSH SERPL DL<=0.05 MIU/L-ACNC: 4.77 UIU/ML (ref 0.36–3.74)
UROBILINOGEN UR QL STRIP.AUTO: 1 EU/DL (ref 0.2–1)
VLDLC SERPL CALC-MCNC: 20.8 MG/DL
WBC # BLD AUTO: 5.6 K/UL (ref 4.6–13.2)

## 2022-11-01 PROCEDURE — 80053 COMPREHEN METABOLIC PANEL: CPT

## 2022-11-01 PROCEDURE — 83036 HEMOGLOBIN GLYCOSYLATED A1C: CPT

## 2022-11-01 PROCEDURE — 84443 ASSAY THYROID STIM HORMONE: CPT

## 2022-11-01 PROCEDURE — 80061 LIPID PANEL: CPT

## 2022-11-01 PROCEDURE — 36415 COLL VENOUS BLD VENIPUNCTURE: CPT

## 2022-11-01 PROCEDURE — 81003 URINALYSIS AUTO W/O SCOPE: CPT

## 2022-11-01 PROCEDURE — 85025 COMPLETE CBC W/AUTO DIFF WBC: CPT

## 2022-11-01 PROCEDURE — 84153 ASSAY OF PSA TOTAL: CPT

## 2022-11-02 LAB
PSA SERPL-MCNC: <0.1 NG/ML (ref 0–4)
REFLEX CRITERIA: NORMAL

## 2023-09-07 ENCOUNTER — OFFICE VISIT (OUTPATIENT)
Age: 78
End: 2023-09-07

## 2023-09-07 VITALS
DIASTOLIC BLOOD PRESSURE: 90 MMHG | OXYGEN SATURATION: 98 % | SYSTOLIC BLOOD PRESSURE: 160 MMHG | WEIGHT: 183 LBS | HEART RATE: 84 BPM | BODY MASS INDEX: 26.26 KG/M2

## 2023-09-07 DIAGNOSIS — I49.3 PVC (PREMATURE VENTRICULAR CONTRACTION): ICD-10-CM

## 2023-09-07 DIAGNOSIS — R00.2 PALPITATION: Primary | ICD-10-CM

## 2023-09-07 DIAGNOSIS — I42.9 CARDIOMYOPATHY, UNSPECIFIED TYPE (HCC): ICD-10-CM

## 2023-09-07 RX ORDER — ROSUVASTATIN CALCIUM 5 MG/1
5 TABLET, COATED ORAL DAILY
COMMUNITY

## 2023-09-07 NOTE — PROGRESS NOTES
History of Present Illness:  78 YOM here for follow up. He is accompanied by a . He has a history of mild neurocognitive dysfunction. Over the past year, especially this summer, he has had episodes where he will feel palpitations in his chest and just will not feel right. They checked his pulse and it had been irregular. He has remote PVCs and remote ablation. No chest pain or dyspnea with this. No syncope. No nausea or vomiting. Impression:  Recent increase in palpitations with irregular heartbeat and remote atrial tachycardia by EP study September 2017, but not more than a minute. Remote loop recorder placed in 2016 and then change-out and then subsequently explanted a couple years ago. History of hypertension, increased today. History of PVCs and remote ablation by Dr. Anatoliy Roberts. History of gastric outflow obstruction and remote pancreatitis. Remote transient cardiomyopathy with last echocardiogram in 2021 with normal EF. Mild cognitive impairment. Plan:  I recommended a home event monitor for 30 days and I will see him back. Given his history of transient cardiomyopathy as well, I am going to follow up with an echocardiogram.  He will monitor his blood pressure at home and I will see him back after testing. Risk for antiarrhythmic medication, possible atrial fibrillation discussed. Will hold off on additional medications for now. Wt Readings from Last 3 Encounters:   09/07/23 183 lb (83 kg)   08/04/22 186 lb (84.4 kg)   08/27/21 189 lb (85.7 kg)     Past Medical History:   Diagnosis Date    Abnormal nuclear cardiac imaging test 10/29/2010    Slightly mottled perfusion w/o focal reversible defect. EF 42%. Global hypk. May represent a nonspecific cardiomyopathy. Anemia     ARF (acute renal failure) ([facility])     Bradycardia     Chronic cholecystitis     DVT (deep venous thrombosis) ([facility])     Echocardiogram abnormal 05/21/2015    EF 55-60%. No RWMA. Gr 1 DDfx. Septal knob.

## 2023-11-02 ENCOUNTER — OFFICE VISIT (OUTPATIENT)
Age: 78
End: 2023-11-02
Payer: MEDICARE

## 2023-11-02 VITALS
BODY MASS INDEX: 25.91 KG/M2 | DIASTOLIC BLOOD PRESSURE: 70 MMHG | OXYGEN SATURATION: 98 % | WEIGHT: 181 LBS | HEART RATE: 90 BPM | HEIGHT: 70 IN | SYSTOLIC BLOOD PRESSURE: 120 MMHG

## 2023-11-02 DIAGNOSIS — R00.2 PALPITATION: Primary | ICD-10-CM

## 2023-11-02 PROCEDURE — 3078F DIAST BP <80 MM HG: CPT | Performed by: INTERNAL MEDICINE

## 2023-11-02 PROCEDURE — G8484 FLU IMMUNIZE NO ADMIN: HCPCS | Performed by: INTERNAL MEDICINE

## 2023-11-02 PROCEDURE — G8419 CALC BMI OUT NRM PARAM NOF/U: HCPCS | Performed by: INTERNAL MEDICINE

## 2023-11-02 PROCEDURE — 3074F SYST BP LT 130 MM HG: CPT | Performed by: INTERNAL MEDICINE

## 2023-11-02 PROCEDURE — 1036F TOBACCO NON-USER: CPT | Performed by: INTERNAL MEDICINE

## 2023-11-02 PROCEDURE — G8428 CUR MEDS NOT DOCUMENT: HCPCS | Performed by: INTERNAL MEDICINE

## 2023-11-02 PROCEDURE — 99214 OFFICE O/P EST MOD 30 MIN: CPT | Performed by: INTERNAL MEDICINE

## 2023-11-02 PROCEDURE — 1123F ACP DISCUSS/DSCN MKR DOCD: CPT | Performed by: INTERNAL MEDICINE

## 2023-11-02 NOTE — PROGRESS NOTES
History of Present Illness:  78 YOM here for follow up. He is accompanied by his daughter. When I saw him in early September he was having some palpitations and spells and they would check his pulse and it was irregular. He has a history of remote PVCs and ablation by Dr. Neptali Shepard. I got an echocardiogram given remote transient cardiomyopathy. I also had him wear an event monitor. He is here to discuss results. Symptoms are stable at this point without any significant recurrence. His wife is in the hospital as well with complication from recent back surgery, so there is a fair amount of stress. Impression:  Recent increase in palpitations, irregular heartbeat, with EP study September 2017 with short episodes of atrial tachycardia. Event monitor October 2023 with occasional PACs with aberrancy, no sustained arrhythmias or atrial fibrillation. Remote loop recorder in 2016, change out and explanted a few years ago. Hypertension, stable today. History of PVCs and remote ablation by Dr. Neptali Shepard. Remote gastric outflow obstruction and remote pancreatitis. Remote transient cardiomyopathy with EF normal September 2023. Moderate aortic root and ascending aorta dilatation, approximately 4 cm. Will plan to repeat an echocardiogram in a year. Plan:  I reviewed his event monitor results without any sustained arrhythmias or atrial fibrillation. He has had at least two loop recorders placed through the years and remote PVC ablation. There is no evidence of atrial fibrillation. Continue with current medications. I discussed his moderately dilated aorta and we will consider repeat echocardiogram in a year when I see him back. Wt Readings from Last 3 Encounters:   11/02/23 82.1 kg (181 lb)   09/08/23 83 kg (183 lb)   09/07/23 83 kg (183 lb)     Past Medical History:   Diagnosis Date    Abnormal nuclear cardiac imaging test 10/29/2010    Slightly mottled perfusion w/o focal reversible defect. EF 42%.

## 2024-11-06 ENCOUNTER — OFFICE VISIT (OUTPATIENT)
Age: 79
End: 2024-11-06

## 2024-11-06 VITALS
BODY MASS INDEX: 26.63 KG/M2 | HEIGHT: 70 IN | OXYGEN SATURATION: 98 % | DIASTOLIC BLOOD PRESSURE: 68 MMHG | HEART RATE: 83 BPM | WEIGHT: 186 LBS | SYSTOLIC BLOOD PRESSURE: 138 MMHG

## 2024-11-06 DIAGNOSIS — I77.819 DILATATION OF AORTA (HCC): ICD-10-CM

## 2024-11-06 DIAGNOSIS — I10 PRIMARY HYPERTENSION: ICD-10-CM

## 2024-11-06 DIAGNOSIS — R00.2 PALPITATION: Primary | ICD-10-CM

## 2024-11-06 DIAGNOSIS — I49.3 PVC (PREMATURE VENTRICULAR CONTRACTION): ICD-10-CM

## 2024-11-06 NOTE — PROGRESS NOTES
History of Present Illness:  79 year-old male here for followup.  He is accompanied by his wife today.  He has had some issues with esophageal strictures, duodenal, recurrent nausea and vomiting requiring dilatation.  His weight, however, is stable.  No significant chest pain, dyspnea, PND, orthopnea or palpitations.  He has had some issues with amyloid and memory issues with recent neuropsychiatric testing reviewed.      Impression:   Mild dementia.   History of amyloid.    Palpitations with EP study 2017 with short episodes of atrial tachycardia.  Event monitor 10/2023 with occasional PACs.   History of PVCs and remote ablation by Dr. Jefferson many years ago.    Remote loop recorder 2016, changed out and explanted a few years ago without any sustained atrial fibrillation.   Hypertension, stable.   Remote gastric outflow obstruction, remote pancreatitis.   Remote transient cardiomyopathy 09/2023, normal EF.    Moderate aortic root dilatation, approximately 4.6 cm last year.      Plan:  Given his known aortic root dilatation and PVCs and amyloid, I will follow up with an echocardiogram.  He has absolutely no symptoms of congestive heart failure and memory is starting to be an issue.  All questions were answered.  I can see him back annually.        Wt Readings from Last 3 Encounters:   11/06/24 84.4 kg (186 lb)   06/27/24 78.5 kg (173 lb 1 oz)   12/27/23 80.6 kg (177 lb 11.1 oz)     Past Medical History:   Diagnosis Date    Abnormal nuclear cardiac imaging test 10/29/2010    Slightly mottled perfusion w/o focal reversible defect.  EF 42%.  Global hypk.  May represent a nonspecific cardiomyopathy.    Anemia     ARF (acute renal failure) ([facility])     Bradycardia     Chronic cholecystitis     DVT (deep venous thrombosis) ([facility])     Echocardiogram abnormal 05/21/2015    EF 55-60%.  No RWMA.  Gr 1 DDfx.  Septal knob.  RVSP 25-30 mmHg.  Mild MR.  Mild TR.  Mild PI.      ED (erectile dysfunction)     Essential hypertension

## (undated) DEVICE — DRESSING AQUACEL ADVANTAGE ALG W4XL5IN RECT GREATER ABSRB HYDRFBR TECHNOLOGY

## (undated) DEVICE — BASIN EMESIS 500CC ROSE 250/CS 60/PLT: Brand: MEDEGEN MEDICAL PRODUCTS, LLC

## (undated) DEVICE — KENDALL 500 SERIES DIAPHORETIC FOAM MONITORING ELECTRODE - TEAR DROP SHAPE ( 30/PK): Brand: KENDALL

## (undated) DEVICE — DEVICE INFL 60ML 12ATM CONVENIENT LOK REL HNDL HI PRSS FLX

## (undated) DEVICE — SNARE ENDO 2.4MMX230CM -- COLD EXACTO

## (undated) DEVICE — FLEX ADVANTAGE 1500CC: Brand: FLEX ADVANTAGE

## (undated) DEVICE — KENDALL RADIOLUCENT FOAM MONITORING ELECTRODE RECTANGULAR SHAPE: Brand: KENDALL

## (undated) DEVICE — SUTURE MCRYL SZ 4 0 L18IN ABSRB VLT PS 1 L24MM 3 8 CIR REV Y682H

## (undated) DEVICE — TUBING, SUCTION, 3/16" X 6', STRAIGHT: Brand: MEDLINE

## (undated) DEVICE — MEDI-VAC NON-CONDUCTIVE SUCTION TUBING: Brand: CARDINAL HEALTH

## (undated) DEVICE — BITE BLK ENDOSCP AD 54FR GRN POLYETH ENDOSCP W STRP SLD

## (undated) DEVICE — ESOPHAGEAL/PYLORIC/BILIARY WIREGUIDED BALLOON DILATATION CATHETER: Brand: CRE™ PRO

## (undated) DEVICE — TRAP SPEC COLL POLYP POLYSTYR --

## (undated) DEVICE — ESOPHAGEAL/PYLORIC/COLONIC/BILIARY WIREGUIDED BALLOON DILATATION CATHETER: Brand: CRE™ PRO

## (undated) DEVICE — KIT COLON W/ 1.1OZ LUB AND 2 END

## (undated) DEVICE — FORCEPS BX L240CM JAW DIA2.8MM L CAP W/ NDL MIC MESH TOOTH

## (undated) DEVICE — SYR 50ML SLIP TIP NSAF LF STRL --

## (undated) DEVICE — Device: Brand: DEFENDO VALVE AND CONNECTOR KIT